# Patient Record
Sex: MALE | Race: WHITE | Employment: OTHER | URBAN - METROPOLITAN AREA
[De-identification: names, ages, dates, MRNs, and addresses within clinical notes are randomized per-mention and may not be internally consistent; named-entity substitution may affect disease eponyms.]

---

## 2017-03-17 ENCOUNTER — APPOINTMENT (OUTPATIENT)
Dept: GENERAL RADIOLOGY | Age: 82
End: 2017-03-17
Payer: MEDICARE

## 2017-03-17 ENCOUNTER — HOSPITAL ENCOUNTER (EMERGENCY)
Age: 82
Discharge: HOME | End: 2017-03-17
Attending: EMERGENCY MEDICINE
Payer: MEDICARE

## 2017-03-17 VITALS
SYSTOLIC BLOOD PRESSURE: 138 MMHG | OXYGEN SATURATION: 98 % | TEMPERATURE: 97.4 F | BODY MASS INDEX: 23.62 KG/M2 | HEIGHT: 70 IN | DIASTOLIC BLOOD PRESSURE: 72 MMHG | HEART RATE: 79 BPM | RESPIRATION RATE: 15 BRPM | WEIGHT: 165 LBS

## 2017-03-17 DIAGNOSIS — R26.81 UNSTEADINESS ON FEET: Primary | ICD-10-CM

## 2017-03-17 LAB
ALBUMIN SERPL-MCNC: 4 G/DL (ref 3.9–4.9)
ALP BLD-CCNC: 81 U/L (ref 35–104)
ALT SERPL-CCNC: 14 U/L (ref 0–41)
ANION GAP SERPL CALCULATED.3IONS-SCNC: 10 MEQ/L (ref 7–13)
AST SERPL-CCNC: 21 U/L (ref 0–40)
BASOPHILS ABSOLUTE: 0.1 K/UL (ref 0–0.2)
BASOPHILS RELATIVE PERCENT: 1.2 %
BILIRUB SERPL-MCNC: 0.4 MG/DL (ref 0–1.2)
BILIRUBIN URINE: NEGATIVE
BLOOD, URINE: NEGATIVE
BUN BLDV-MCNC: 31 MG/DL (ref 8–23)
CALCIUM SERPL-MCNC: 9.6 MG/DL (ref 8.6–10.2)
CHLORIDE BLD-SCNC: 99 MEQ/L (ref 98–107)
CLARITY: CLEAR
CO2: 29 MEQ/L (ref 22–29)
COLOR: YELLOW
CREAT SERPL-MCNC: 0.72 MG/DL (ref 0.7–1.2)
EKG ATRIAL RATE: 110 BPM
EKG Q-T INTERVAL: 466 MS
EKG QRS DURATION: 146 MS
EKG QTC CALCULATION (BAZETT): 541 MS
EKG R AXIS: 263 DEGREES
EKG T AXIS: 75 DEGREES
EKG VENTRICULAR RATE: 81 BPM
EOSINOPHILS ABSOLUTE: 0.3 K/UL (ref 0–0.7)
EOSINOPHILS RELATIVE PERCENT: 4.2 %
GFR AFRICAN AMERICAN: >60
GFR NON-AFRICAN AMERICAN: >60
GLOBULIN: 2.6 G/DL (ref 2.3–3.5)
GLUCOSE BLD-MCNC: 133 MG/DL (ref 74–109)
GLUCOSE URINE: NEGATIVE MG/DL
HCT VFR BLD CALC: 45.2 % (ref 42–52)
HEMOGLOBIN: 15.2 G/DL (ref 14–18)
KETONES, URINE: NEGATIVE MG/DL
LEUKOCYTE ESTERASE, URINE: NEGATIVE
LYMPHOCYTES ABSOLUTE: 1.5 K/UL (ref 1–4.8)
LYMPHOCYTES RELATIVE PERCENT: 23.1 %
MCH RBC QN AUTO: 31.3 PG (ref 27–31.3)
MCHC RBC AUTO-ENTMCNC: 33.7 % (ref 33–37)
MCV RBC AUTO: 92.9 FL (ref 80–100)
MONOCYTES ABSOLUTE: 0.9 K/UL (ref 0.2–0.8)
MONOCYTES RELATIVE PERCENT: 13.7 %
NEUTROPHILS ABSOLUTE: 3.7 K/UL (ref 1.4–6.5)
NEUTROPHILS RELATIVE PERCENT: 57.8 %
NITRITE, URINE: NEGATIVE
PDW BLD-RTO: 13.2 % (ref 11.5–14.5)
PH UA: 7 (ref 5–9)
PLATELET # BLD: 201 K/UL (ref 130–400)
POTASSIUM SERPL-SCNC: 4.1 MEQ/L (ref 3.5–5.1)
PROTEIN UA: NEGATIVE MG/DL
RAPID INFLUENZA  B AGN: NEGATIVE
RAPID INFLUENZA A AGN: NEGATIVE
RBC # BLD: 4.86 M/UL (ref 4.7–6.1)
SODIUM BLD-SCNC: 138 MEQ/L (ref 132–144)
SPECIFIC GRAVITY UA: 1.02 (ref 1–1.03)
TOTAL PROTEIN: 6.6 G/DL (ref 6.4–8.1)
TROPONIN: 0.02 NG/ML (ref 0–0.01)
URINE REFLEX TO CULTURE: NORMAL
UROBILINOGEN, URINE: 0.2 E.U./DL
WBC # BLD: 6.4 K/UL (ref 4.8–10.8)

## 2017-03-17 PROCEDURE — 36415 COLL VENOUS BLD VENIPUNCTURE: CPT

## 2017-03-17 PROCEDURE — 71010 XR CHEST PORTABLE: CPT

## 2017-03-17 PROCEDURE — 87077 CULTURE AEROBIC IDENTIFY: CPT

## 2017-03-17 PROCEDURE — 99284 EMERGENCY DEPT VISIT MOD MDM: CPT

## 2017-03-17 PROCEDURE — 96365 THER/PROPH/DIAG IV INF INIT: CPT

## 2017-03-17 PROCEDURE — 80053 COMPREHEN METABOLIC PANEL: CPT

## 2017-03-17 PROCEDURE — 87149 DNA/RNA DIRECT PROBE: CPT

## 2017-03-17 PROCEDURE — 93005 ELECTROCARDIOGRAM TRACING: CPT

## 2017-03-17 PROCEDURE — 6360000002 HC RX W HCPCS: Performed by: EMERGENCY MEDICINE

## 2017-03-17 PROCEDURE — 86403 PARTICLE AGGLUT ANTBDY SCRN: CPT

## 2017-03-17 PROCEDURE — 87040 BLOOD CULTURE FOR BACTERIA: CPT

## 2017-03-17 PROCEDURE — 84484 ASSAY OF TROPONIN QUANT: CPT

## 2017-03-17 PROCEDURE — 2580000003 HC RX 258: Performed by: EMERGENCY MEDICINE

## 2017-03-17 PROCEDURE — 81003 URINALYSIS AUTO W/O SCOPE: CPT

## 2017-03-17 PROCEDURE — 85025 COMPLETE CBC W/AUTO DIFF WBC: CPT

## 2017-03-17 RX ORDER — SODIUM CHLORIDE 0.9 % (FLUSH) 0.9 %
3 SYRINGE (ML) INJECTION EVERY 8 HOURS
Status: DISCONTINUED | OUTPATIENT
Start: 2017-03-17 | End: 2017-03-17 | Stop reason: HOSPADM

## 2017-03-17 RX ORDER — 0.9 % SODIUM CHLORIDE 0.9 %
500 INTRAVENOUS SOLUTION INTRAVENOUS ONCE
Status: COMPLETED | OUTPATIENT
Start: 2017-03-17 | End: 2017-03-17

## 2017-03-17 RX ADMIN — CEFTRIAXONE SODIUM 1 G: 1 INJECTION, POWDER, FOR SOLUTION INTRAMUSCULAR; INTRAVENOUS at 04:04

## 2017-03-17 RX ADMIN — Medication 3 ML: at 04:04

## 2017-03-17 RX ADMIN — SODIUM CHLORIDE 500 ML: 9 INJECTION, SOLUTION INTRAVENOUS at 04:04

## 2017-03-20 LAB
BLOOD CULTURE, ROUTINE: ABNORMAL
ORGANISM: ABNORMAL
ORGANISM: ABNORMAL

## 2017-03-22 LAB — CULTURE, BLOOD 2: NORMAL

## 2017-04-05 RX ORDER — SOTALOL HYDROCHLORIDE 80 MG/1
TABLET ORAL
Qty: 135 TABLET | Refills: 3 | Status: ON HOLD | OUTPATIENT
Start: 2017-04-05 | End: 2017-04-28 | Stop reason: HOSPADM

## 2017-04-11 DIAGNOSIS — I48.20 CHRONIC ATRIAL FIBRILLATION (HCC): ICD-10-CM

## 2017-04-25 ENCOUNTER — HOSPITAL ENCOUNTER (INPATIENT)
Age: 82
LOS: 3 days | Discharge: HOME | DRG: 310 | End: 2017-04-28
Attending: EMERGENCY MEDICINE | Admitting: INTERNAL MEDICINE
Payer: MEDICARE

## 2017-04-25 ENCOUNTER — APPOINTMENT (OUTPATIENT)
Dept: GENERAL RADIOLOGY | Age: 82
DRG: 310 | End: 2017-04-25
Payer: MEDICARE

## 2017-04-25 DIAGNOSIS — I47.20 VENTRICULAR TACHYCARDIA: ICD-10-CM

## 2017-04-25 DIAGNOSIS — I48.21 PERMANENT ATRIAL FIBRILLATION (HCC): Primary | ICD-10-CM

## 2017-04-25 PROBLEM — E78.2 MIXED DYSLIPIDEMIA: Status: ACTIVE | Noted: 2017-04-25

## 2017-04-25 PROBLEM — I10 ESSENTIAL HYPERTENSION: Status: ACTIVE | Noted: 2017-04-25

## 2017-04-25 LAB
ALBUMIN SERPL-MCNC: 4.2 G/DL (ref 3.9–4.9)
ALP BLD-CCNC: 82 U/L (ref 35–104)
ALT SERPL-CCNC: 20 U/L (ref 0–41)
ANION GAP SERPL CALCULATED.3IONS-SCNC: 15 MEQ/L (ref 7–13)
AST SERPL-CCNC: 22 U/L (ref 0–40)
BASOPHILS ABSOLUTE: 0 K/UL (ref 0–0.2)
BASOPHILS RELATIVE PERCENT: 0.5 %
BILIRUB SERPL-MCNC: 0.8 MG/DL (ref 0–1.2)
BILIRUBIN URINE: NEGATIVE
BLOOD, URINE: ABNORMAL
BUN BLDV-MCNC: 29 MG/DL (ref 8–23)
CALCIUM SERPL-MCNC: 9.8 MG/DL (ref 8.6–10.2)
CHLORIDE BLD-SCNC: 97 MEQ/L (ref 98–107)
CLARITY: CLEAR
CO2: 23 MEQ/L (ref 22–29)
COLOR: ABNORMAL
CREAT SERPL-MCNC: 0.94 MG/DL (ref 0.7–1.2)
EOSINOPHILS ABSOLUTE: 0.2 K/UL (ref 0–0.7)
EOSINOPHILS RELATIVE PERCENT: 2.6 %
GFR AFRICAN AMERICAN: >60
GFR NON-AFRICAN AMERICAN: >60
GLOBULIN: 2.7 G/DL (ref 2.3–3.5)
GLUCOSE BLD-MCNC: 144 MG/DL (ref 74–109)
GLUCOSE URINE: NEGATIVE MG/DL
HCT VFR BLD CALC: 46 % (ref 42–52)
HEMOGLOBIN: 15.7 G/DL (ref 14–18)
INR BLD: 1.2
KETONES, URINE: NEGATIVE MG/DL
LEUKOCYTE ESTERASE, URINE: ABNORMAL
LV EF: 55 %
LVEF MODALITY: NORMAL
LYMPHOCYTES ABSOLUTE: 1.4 K/UL (ref 1–4.8)
LYMPHOCYTES RELATIVE PERCENT: 18.8 %
MAGNESIUM: 2 MG/DL (ref 1.7–2.3)
MCH RBC QN AUTO: 31.3 PG (ref 27–31.3)
MCHC RBC AUTO-ENTMCNC: 34.2 % (ref 33–37)
MCV RBC AUTO: 91.5 FL (ref 80–100)
MONOCYTES ABSOLUTE: 0.8 K/UL (ref 0.2–0.8)
MONOCYTES RELATIVE PERCENT: 11.5 %
NEUTROPHILS ABSOLUTE: 4.8 K/UL (ref 1.4–6.5)
NEUTROPHILS RELATIVE PERCENT: 66.6 %
NITRITE, URINE: NEGATIVE
PDW BLD-RTO: 13.6 % (ref 11.5–14.5)
PH UA: 6 (ref 5–9)
PLATELET # BLD: 185 K/UL (ref 130–400)
POTASSIUM SERPL-SCNC: 4.4 MEQ/L (ref 3.5–5.1)
PRO-BNP: 5983 PG/ML
PROTEIN UA: NEGATIVE MG/DL
PROTHROMBIN TIME: 12.5 SEC (ref 8.1–13.7)
RBC # BLD: 5.02 M/UL (ref 4.7–6.1)
RBC UA: >100 /HPF (ref 0–2)
SODIUM BLD-SCNC: 135 MEQ/L (ref 132–144)
SPECIFIC GRAVITY UA: 1.01 (ref 1–1.03)
TOTAL CK: 68 U/L (ref 0–190)
TOTAL PROTEIN: 6.9 G/DL (ref 6.4–8.1)
TROPONIN: 0.01 NG/ML (ref 0–0.01)
TROPONIN: 0.02 NG/ML (ref 0–0.01)
TROPONIN: 0.03 NG/ML (ref 0–0.01)
TSH SERPL DL<=0.05 MIU/L-ACNC: 2.74 UIU/ML (ref 0.27–4.2)
UROBILINOGEN, URINE: 0.2 E.U./DL
WBC # BLD: 7.2 K/UL (ref 4.8–10.8)
WBC UA: ABNORMAL /HPF (ref 0–5)

## 2017-04-25 PROCEDURE — 36415 COLL VENOUS BLD VENIPUNCTURE: CPT

## 2017-04-25 PROCEDURE — 93306 TTE W/DOPPLER COMPLETE: CPT | Performed by: INTERNAL MEDICINE

## 2017-04-25 PROCEDURE — 6360000002 HC RX W HCPCS: Performed by: PHYSICIAN ASSISTANT

## 2017-04-25 PROCEDURE — 2580000003 HC RX 258

## 2017-04-25 PROCEDURE — 96374 THER/PROPH/DIAG INJ IV PUSH: CPT

## 2017-04-25 PROCEDURE — 93306 TTE W/DOPPLER COMPLETE: CPT

## 2017-04-25 PROCEDURE — 99285 EMERGENCY DEPT VISIT HI MDM: CPT

## 2017-04-25 PROCEDURE — 85025 COMPLETE CBC W/AUTO DIFF WBC: CPT

## 2017-04-25 PROCEDURE — 99222 1ST HOSP IP/OBS MODERATE 55: CPT | Performed by: PHYSICIAN ASSISTANT

## 2017-04-25 PROCEDURE — 85610 PROTHROMBIN TIME: CPT

## 2017-04-25 PROCEDURE — 71010 XR CHEST PORTABLE: CPT

## 2017-04-25 PROCEDURE — 81001 URINALYSIS AUTO W/SCOPE: CPT

## 2017-04-25 PROCEDURE — 96375 TX/PRO/DX INJ NEW DRUG ADDON: CPT

## 2017-04-25 PROCEDURE — 84443 ASSAY THYROID STIM HORMONE: CPT

## 2017-04-25 PROCEDURE — 83735 ASSAY OF MAGNESIUM: CPT

## 2017-04-25 PROCEDURE — 6360000002 HC RX W HCPCS: Performed by: EMERGENCY MEDICINE

## 2017-04-25 PROCEDURE — 2060000000 HC ICU INTERMEDIATE R&B

## 2017-04-25 PROCEDURE — 82550 ASSAY OF CK (CPK): CPT

## 2017-04-25 PROCEDURE — 2580000003 HC RX 258: Performed by: PHYSICIAN ASSISTANT

## 2017-04-25 PROCEDURE — 2580000003 HC RX 258: Performed by: EMERGENCY MEDICINE

## 2017-04-25 PROCEDURE — 6370000000 HC RX 637 (ALT 250 FOR IP): Performed by: PHYSICIAN ASSISTANT

## 2017-04-25 PROCEDURE — 80053 COMPREHEN METABOLIC PANEL: CPT

## 2017-04-25 PROCEDURE — 84484 ASSAY OF TROPONIN QUANT: CPT

## 2017-04-25 PROCEDURE — 93005 ELECTROCARDIOGRAM TRACING: CPT

## 2017-04-25 PROCEDURE — 83880 ASSAY OF NATRIURETIC PEPTIDE: CPT

## 2017-04-25 RX ORDER — ONDANSETRON 2 MG/ML
4 INJECTION INTRAMUSCULAR; INTRAVENOUS EVERY 6 HOURS PRN
Status: DISCONTINUED | OUTPATIENT
Start: 2017-04-25 | End: 2017-04-28 | Stop reason: HOSPADM

## 2017-04-25 RX ORDER — METOPROLOL TARTRATE 50 MG/1
50 TABLET, FILM COATED ORAL 2 TIMES DAILY
Status: DISCONTINUED | OUTPATIENT
Start: 2017-04-25 | End: 2017-04-28 | Stop reason: HOSPADM

## 2017-04-25 RX ORDER — HYDROCODONE BITARTRATE AND ACETAMINOPHEN 5; 325 MG/1; MG/1
1 TABLET ORAL EVERY 4 HOURS PRN
Status: DISCONTINUED | OUTPATIENT
Start: 2017-04-25 | End: 2017-04-28 | Stop reason: HOSPADM

## 2017-04-25 RX ORDER — MORPHINE SULFATE 2 MG/ML
2 INJECTION, SOLUTION INTRAMUSCULAR; INTRAVENOUS
Status: DISCONTINUED | OUTPATIENT
Start: 2017-04-25 | End: 2017-04-28 | Stop reason: HOSPADM

## 2017-04-25 RX ORDER — FAMOTIDINE 20 MG/1
20 TABLET, FILM COATED ORAL 2 TIMES DAILY PRN
Status: DISCONTINUED | OUTPATIENT
Start: 2017-04-25 | End: 2017-04-28 | Stop reason: HOSPADM

## 2017-04-25 RX ORDER — HYDROCODONE BITARTRATE AND ACETAMINOPHEN 5; 325 MG/1; MG/1
2 TABLET ORAL EVERY 4 HOURS PRN
Status: DISCONTINUED | OUTPATIENT
Start: 2017-04-25 | End: 2017-04-28 | Stop reason: HOSPADM

## 2017-04-25 RX ORDER — AMIODARONE HYDROCHLORIDE 50 MG/ML
150 INJECTION, SOLUTION INTRAVENOUS ONCE
Status: COMPLETED | OUTPATIENT
Start: 2017-04-25 | End: 2017-04-25

## 2017-04-25 RX ORDER — NITROGLYCERIN 0.4 MG/1
0.4 TABLET SUBLINGUAL EVERY 5 MIN PRN
Status: DISCONTINUED | OUTPATIENT
Start: 2017-04-25 | End: 2017-04-28 | Stop reason: HOSPADM

## 2017-04-25 RX ORDER — SODIUM CHLORIDE 0.9 % (FLUSH) 0.9 %
10 SYRINGE (ML) INJECTION PRN
Status: DISCONTINUED | OUTPATIENT
Start: 2017-04-25 | End: 2017-04-28 | Stop reason: HOSPADM

## 2017-04-25 RX ORDER — TAMSULOSIN HYDROCHLORIDE 0.4 MG/1
0.4 CAPSULE ORAL DAILY
Status: DISCONTINUED | OUTPATIENT
Start: 2017-04-25 | End: 2017-04-28 | Stop reason: HOSPADM

## 2017-04-25 RX ORDER — DOCUSATE SODIUM 100 MG/1
300 CAPSULE, LIQUID FILLED ORAL EVERY OTHER DAY
COMMUNITY
End: 2017-07-23 | Stop reason: ALTCHOICE

## 2017-04-25 RX ORDER — COLCHICINE 0.6 MG/1
0.6 TABLET ORAL DAILY
Status: DISCONTINUED | OUTPATIENT
Start: 2017-04-25 | End: 2017-04-28 | Stop reason: HOSPADM

## 2017-04-25 RX ORDER — ASPIRIN 81 MG/1
81 TABLET, CHEWABLE ORAL DAILY
Status: DISCONTINUED | OUTPATIENT
Start: 2017-04-25 | End: 2017-04-28 | Stop reason: HOSPADM

## 2017-04-25 RX ORDER — ATORVASTATIN CALCIUM 20 MG/1
20 TABLET, FILM COATED ORAL NIGHTLY
Status: DISCONTINUED | OUTPATIENT
Start: 2017-04-25 | End: 2017-04-28 | Stop reason: HOSPADM

## 2017-04-25 RX ORDER — MORPHINE SULFATE 4 MG/ML
4 INJECTION, SOLUTION INTRAMUSCULAR; INTRAVENOUS
Status: DISCONTINUED | OUTPATIENT
Start: 2017-04-25 | End: 2017-04-28 | Stop reason: HOSPADM

## 2017-04-25 RX ORDER — SODIUM CHLORIDE 9 MG/ML
INJECTION, SOLUTION INTRAVENOUS CONTINUOUS
Status: ACTIVE | OUTPATIENT
Start: 2017-04-25 | End: 2017-04-26

## 2017-04-25 RX ORDER — 0.9 % SODIUM CHLORIDE 0.9 %
500 INTRAVENOUS SOLUTION INTRAVENOUS ONCE
Status: COMPLETED | OUTPATIENT
Start: 2017-04-25 | End: 2017-04-25

## 2017-04-25 RX ORDER — MIDODRINE HYDROCHLORIDE 2.5 MG/1
5 TABLET ORAL 3 TIMES DAILY
Status: DISCONTINUED | OUTPATIENT
Start: 2017-04-25 | End: 2017-04-28 | Stop reason: HOSPADM

## 2017-04-25 RX ORDER — SODIUM CHLORIDE 9 MG/ML
INJECTION, SOLUTION INTRAVENOUS CONTINUOUS
Status: DISCONTINUED | OUTPATIENT
Start: 2017-04-25 | End: 2017-04-28 | Stop reason: HOSPADM

## 2017-04-25 RX ORDER — LOSARTAN POTASSIUM 50 MG/1
100 TABLET ORAL DAILY
Status: DISCONTINUED | OUTPATIENT
Start: 2017-04-25 | End: 2017-04-28 | Stop reason: HOSPADM

## 2017-04-25 RX ORDER — ACETAMINOPHEN 325 MG/1
650 TABLET ORAL EVERY 4 HOURS PRN
Status: DISCONTINUED | OUTPATIENT
Start: 2017-04-25 | End: 2017-04-28 | Stop reason: HOSPADM

## 2017-04-25 RX ORDER — SODIUM CHLORIDE 0.9 % (FLUSH) 0.9 %
10 SYRINGE (ML) INJECTION EVERY 12 HOURS SCHEDULED
Status: DISCONTINUED | OUTPATIENT
Start: 2017-04-25 | End: 2017-04-28 | Stop reason: HOSPADM

## 2017-04-25 RX ORDER — FINASTERIDE 5 MG/1
5 TABLET, FILM COATED ORAL DAILY
Status: DISCONTINUED | OUTPATIENT
Start: 2017-04-25 | End: 2017-04-28 | Stop reason: HOSPADM

## 2017-04-25 RX ADMIN — LOSARTAN POTASSIUM 100 MG: 50 TABLET ORAL at 18:58

## 2017-04-25 RX ADMIN — MIDODRINE HYDROCHLORIDE 5 MG: 2.5 TABLET ORAL at 21:50

## 2017-04-25 RX ADMIN — Medication 400 MG: at 18:58

## 2017-04-25 RX ADMIN — AMIODARONE HYDROCHLORIDE 1 MG/MIN: 50 INJECTION, SOLUTION INTRAVENOUS at 09:09

## 2017-04-25 RX ADMIN — SODIUM CHLORIDE 500 ML: 9 INJECTION, SOLUTION INTRAVENOUS at 08:55

## 2017-04-25 RX ADMIN — FINASTERIDE 5 MG: 5 TABLET, FILM COATED ORAL at 18:58

## 2017-04-25 RX ADMIN — ATORVASTATIN CALCIUM 20 MG: 20 TABLET, FILM COATED ORAL at 21:50

## 2017-04-25 RX ADMIN — LIDOCAINE HYDROCHLORIDE 100 MG: 20 INJECTION, SOLUTION INTRAVENOUS at 08:29

## 2017-04-25 RX ADMIN — COLCHICINE 0.6 MG: 0.6 TABLET, FILM COATED ORAL at 18:58

## 2017-04-25 RX ADMIN — AMIODARONE HYDROCHLORIDE 1 MG/MIN: 50 INJECTION, SOLUTION INTRAVENOUS at 17:00

## 2017-04-25 RX ADMIN — AMIODARONE HYDROCHLORIDE 150 MG: 50 INJECTION, SOLUTION INTRAVENOUS at 08:25

## 2017-04-25 RX ADMIN — ASPIRIN 81 MG: 81 TABLET, CHEWABLE ORAL at 18:57

## 2017-04-25 RX ADMIN — AMIODARONE HYDROCHLORIDE 150 MG: 50 INJECTION, SOLUTION INTRAVENOUS at 08:27

## 2017-04-25 RX ADMIN — METOPROLOL TARTRATE 50 MG: 50 TABLET ORAL at 21:50

## 2017-04-25 RX ADMIN — TAMSULOSIN HYDROCHLORIDE 0.4 MG: 0.4 CAPSULE ORAL at 18:58

## 2017-04-25 ASSESSMENT — PAIN SCALES - GENERAL: PAINLEVEL_OUTOF10: 1

## 2017-04-25 ASSESSMENT — ENCOUNTER SYMPTOMS
COUGH: 0
APNEA: 0
NAUSEA: 0
CHEST TIGHTNESS: 0
VOMITING: 0
NAUSEA: 1
SHORTNESS OF BREATH: 1
WHEEZING: 0
COLOR CHANGE: 0
ABDOMINAL DISTENTION: 0
SHORTNESS OF BREATH: 0
ABDOMINAL PAIN: 0

## 2017-04-26 LAB
ALBUMIN SERPL-MCNC: 3.7 G/DL (ref 3.9–4.9)
ALP BLD-CCNC: 71 U/L (ref 35–104)
ALT SERPL-CCNC: 17 U/L (ref 0–41)
ANION GAP SERPL CALCULATED.3IONS-SCNC: 13 MEQ/L (ref 7–13)
AST SERPL-CCNC: 21 U/L (ref 0–40)
BILIRUB SERPL-MCNC: 1 MG/DL (ref 0–1.2)
BUN BLDV-MCNC: 23 MG/DL (ref 8–23)
CALCIUM SERPL-MCNC: 8.8 MG/DL (ref 8.6–10.2)
CHLORIDE BLD-SCNC: 102 MEQ/L (ref 98–107)
CHOLESTEROL, TOTAL: 148 MG/DL (ref 0–199)
CO2: 20 MEQ/L (ref 22–29)
CREAT SERPL-MCNC: 0.91 MG/DL (ref 0.7–1.2)
GFR AFRICAN AMERICAN: >60
GFR NON-AFRICAN AMERICAN: >60
GLOBULIN: 2.4 G/DL (ref 2.3–3.5)
GLUCOSE BLD-MCNC: 128 MG/DL (ref 74–109)
HCT VFR BLD CALC: 42.7 % (ref 42–52)
HDLC SERPL-MCNC: 47 MG/DL (ref 40–59)
HEMOGLOBIN: 14.5 G/DL (ref 14–18)
INR BLD: 1.2
LDL CHOLESTEROL CALCULATED: 86 MG/DL (ref 0–129)
MAGNESIUM: 2.2 MG/DL (ref 1.7–2.3)
MCH RBC QN AUTO: 31.3 PG (ref 27–31.3)
MCHC RBC AUTO-ENTMCNC: 34.1 % (ref 33–37)
MCV RBC AUTO: 91.9 FL (ref 80–100)
PDW BLD-RTO: 13.1 % (ref 11.5–14.5)
PLATELET # BLD: 154 K/UL (ref 130–400)
POTASSIUM SERPL-SCNC: 3.8 MEQ/L (ref 3.5–5.1)
PROTHROMBIN TIME: 12.8 SEC (ref 8.1–13.7)
RBC # BLD: 4.65 M/UL (ref 4.7–6.1)
SODIUM BLD-SCNC: 135 MEQ/L (ref 132–144)
TOTAL PROTEIN: 6.1 G/DL (ref 6.4–8.1)
TRIGL SERPL-MCNC: 73 MG/DL (ref 0–200)
WBC # BLD: 6.5 K/UL (ref 4.8–10.8)

## 2017-04-26 PROCEDURE — 36415 COLL VENOUS BLD VENIPUNCTURE: CPT

## 2017-04-26 PROCEDURE — 2700000000 HC OXYGEN THERAPY PER DAY

## 2017-04-26 PROCEDURE — 80053 COMPREHEN METABOLIC PANEL: CPT

## 2017-04-26 PROCEDURE — 85610 PROTHROMBIN TIME: CPT

## 2017-04-26 PROCEDURE — 80061 LIPID PANEL: CPT

## 2017-04-26 PROCEDURE — 6370000000 HC RX 637 (ALT 250 FOR IP): Performed by: PHYSICIAN ASSISTANT

## 2017-04-26 PROCEDURE — 99232 SBSQ HOSP IP/OBS MODERATE 35: CPT | Performed by: PHYSICIAN ASSISTANT

## 2017-04-26 PROCEDURE — 2060000000 HC ICU INTERMEDIATE R&B

## 2017-04-26 PROCEDURE — 93005 ELECTROCARDIOGRAM TRACING: CPT

## 2017-04-26 PROCEDURE — 6360000002 HC RX W HCPCS: Performed by: PHYSICIAN ASSISTANT

## 2017-04-26 PROCEDURE — 83735 ASSAY OF MAGNESIUM: CPT

## 2017-04-26 PROCEDURE — 2580000003 HC RX 258: Performed by: PHYSICIAN ASSISTANT

## 2017-04-26 PROCEDURE — 85027 COMPLETE CBC AUTOMATED: CPT

## 2017-04-26 RX ORDER — AMIODARONE HYDROCHLORIDE 200 MG/1
200 TABLET ORAL DAILY
Status: DISCONTINUED | OUTPATIENT
Start: 2017-04-27 | End: 2017-04-28 | Stop reason: HOSPADM

## 2017-04-26 RX ORDER — AMIODARONE HYDROCHLORIDE 200 MG/1
200 TABLET ORAL 2 TIMES DAILY
Status: DISCONTINUED | OUTPATIENT
Start: 2017-04-26 | End: 2017-04-26

## 2017-04-26 RX ADMIN — MIDODRINE HYDROCHLORIDE 5 MG: 2.5 TABLET ORAL at 21:05

## 2017-04-26 RX ADMIN — LOSARTAN POTASSIUM 100 MG: 50 TABLET ORAL at 09:03

## 2017-04-26 RX ADMIN — APIXABAN 5 MG: 5 TABLET, FILM COATED ORAL at 21:04

## 2017-04-26 RX ADMIN — METOPROLOL TARTRATE 50 MG: 50 TABLET ORAL at 09:04

## 2017-04-26 RX ADMIN — AMIODARONE HYDROCHLORIDE 0.5 MG/MIN: 50 INJECTION, SOLUTION INTRAVENOUS at 00:20

## 2017-04-26 RX ADMIN — AMIODARONE HYDROCHLORIDE 200 MG: 200 TABLET ORAL at 12:11

## 2017-04-26 RX ADMIN — MIDODRINE HYDROCHLORIDE 5 MG: 2.5 TABLET ORAL at 15:06

## 2017-04-26 RX ADMIN — MIDODRINE HYDROCHLORIDE 5 MG: 2.5 TABLET ORAL at 09:04

## 2017-04-26 RX ADMIN — RIVAROXABAN 15 MG: 15 TABLET, FILM COATED ORAL at 09:04

## 2017-04-26 RX ADMIN — COLCHICINE 0.6 MG: 0.6 TABLET, FILM COATED ORAL at 09:03

## 2017-04-26 RX ADMIN — FINASTERIDE 5 MG: 5 TABLET, FILM COATED ORAL at 09:03

## 2017-04-26 RX ADMIN — Medication 400 MG: at 09:04

## 2017-04-26 RX ADMIN — ASPIRIN 81 MG: 81 TABLET, CHEWABLE ORAL at 09:04

## 2017-04-26 RX ADMIN — METOPROLOL TARTRATE 50 MG: 50 TABLET ORAL at 21:04

## 2017-04-26 RX ADMIN — ATORVASTATIN CALCIUM 20 MG: 20 TABLET, FILM COATED ORAL at 21:04

## 2017-04-26 RX ADMIN — TAMSULOSIN HYDROCHLORIDE 0.4 MG: 0.4 CAPSULE ORAL at 09:04

## 2017-04-26 RX ADMIN — Medication 10 ML: at 21:00

## 2017-04-26 ASSESSMENT — ENCOUNTER SYMPTOMS
VOMITING: 0
SHORTNESS OF BREATH: 0
ABDOMINAL PAIN: 0
NAUSEA: 0
COUGH: 0
COLOR CHANGE: 0
WHEEZING: 0
CHEST TIGHTNESS: 0
APNEA: 0

## 2017-04-26 ASSESSMENT — PAIN SCALES - GENERAL
PAINLEVEL_OUTOF10: 0

## 2017-04-27 LAB — MAGNESIUM: 2.1 MG/DL (ref 1.7–2.3)

## 2017-04-27 PROCEDURE — 6370000000 HC RX 637 (ALT 250 FOR IP): Performed by: PHYSICIAN ASSISTANT

## 2017-04-27 PROCEDURE — 83735 ASSAY OF MAGNESIUM: CPT

## 2017-04-27 PROCEDURE — 36415 COLL VENOUS BLD VENIPUNCTURE: CPT

## 2017-04-27 PROCEDURE — 2580000003 HC RX 258: Performed by: PHYSICIAN ASSISTANT

## 2017-04-27 PROCEDURE — 93005 ELECTROCARDIOGRAM TRACING: CPT

## 2017-04-27 PROCEDURE — 2060000000 HC ICU INTERMEDIATE R&B

## 2017-04-27 RX ADMIN — Medication 10 ML: at 21:15

## 2017-04-27 RX ADMIN — MIDODRINE HYDROCHLORIDE 5 MG: 2.5 TABLET ORAL at 13:57

## 2017-04-27 RX ADMIN — ASPIRIN 81 MG: 81 TABLET, CHEWABLE ORAL at 09:57

## 2017-04-27 RX ADMIN — APIXABAN 5 MG: 5 TABLET, FILM COATED ORAL at 21:15

## 2017-04-27 RX ADMIN — AMIODARONE HYDROCHLORIDE 200 MG: 200 TABLET ORAL at 09:56

## 2017-04-27 RX ADMIN — METOPROLOL TARTRATE 50 MG: 50 TABLET ORAL at 21:15

## 2017-04-27 RX ADMIN — APIXABAN 5 MG: 5 TABLET, FILM COATED ORAL at 09:56

## 2017-04-27 RX ADMIN — FINASTERIDE 5 MG: 5 TABLET, FILM COATED ORAL at 09:57

## 2017-04-27 RX ADMIN — COLCHICINE 0.6 MG: 0.6 TABLET, FILM COATED ORAL at 09:57

## 2017-04-27 RX ADMIN — ATORVASTATIN CALCIUM 20 MG: 20 TABLET, FILM COATED ORAL at 21:15

## 2017-04-27 RX ADMIN — TAMSULOSIN HYDROCHLORIDE 0.4 MG: 0.4 CAPSULE ORAL at 09:57

## 2017-04-27 RX ADMIN — METOPROLOL TARTRATE 50 MG: 50 TABLET ORAL at 09:56

## 2017-04-27 RX ADMIN — Medication 10 ML: at 09:58

## 2017-04-27 RX ADMIN — LOSARTAN POTASSIUM 100 MG: 50 TABLET ORAL at 09:56

## 2017-04-27 RX ADMIN — MIDODRINE HYDROCHLORIDE 5 MG: 2.5 TABLET ORAL at 09:56

## 2017-04-27 RX ADMIN — Medication 400 MG: at 09:56

## 2017-04-27 ASSESSMENT — ENCOUNTER SYMPTOMS
COLOR CHANGE: 0
SHORTNESS OF BREATH: 0
NAUSEA: 0
COUGH: 0
ABDOMINAL PAIN: 0
VOMITING: 0
WHEEZING: 0
APNEA: 0
CHEST TIGHTNESS: 0

## 2017-04-27 ASSESSMENT — PAIN SCALES - GENERAL
PAINLEVEL_OUTOF10: 0

## 2017-04-28 VITALS
SYSTOLIC BLOOD PRESSURE: 142 MMHG | BODY MASS INDEX: 23.62 KG/M2 | HEIGHT: 70 IN | TEMPERATURE: 97.4 F | DIASTOLIC BLOOD PRESSURE: 67 MMHG | OXYGEN SATURATION: 100 % | WEIGHT: 165 LBS | RESPIRATION RATE: 18 BRPM | HEART RATE: 72 BPM

## 2017-04-28 LAB
ANION GAP SERPL CALCULATED.3IONS-SCNC: 11 MEQ/L (ref 7–13)
BUN BLDV-MCNC: 23 MG/DL (ref 8–23)
CALCIUM SERPL-MCNC: 8.9 MG/DL (ref 8.6–10.2)
CHLORIDE BLD-SCNC: 105 MEQ/L (ref 98–107)
CO2: 22 MEQ/L (ref 22–29)
CREAT SERPL-MCNC: 0.91 MG/DL (ref 0.7–1.2)
GFR AFRICAN AMERICAN: >60
GFR NON-AFRICAN AMERICAN: >60
GLUCOSE BLD-MCNC: 98 MG/DL (ref 74–109)
HCT VFR BLD CALC: 43.1 % (ref 42–52)
HEMOGLOBIN: 14.5 G/DL (ref 14–18)
MAGNESIUM: 2 MG/DL (ref 1.7–2.3)
MCH RBC QN AUTO: 31.1 PG (ref 27–31.3)
MCHC RBC AUTO-ENTMCNC: 33.6 % (ref 33–37)
MCV RBC AUTO: 92.8 FL (ref 80–100)
PDW BLD-RTO: 13.3 % (ref 11.5–14.5)
PLATELET # BLD: 160 K/UL (ref 130–400)
POTASSIUM SERPL-SCNC: 3.7 MEQ/L (ref 3.5–5.1)
RBC # BLD: 4.64 M/UL (ref 4.7–6.1)
SODIUM BLD-SCNC: 138 MEQ/L (ref 132–144)
WBC # BLD: 5.9 K/UL (ref 4.8–10.8)

## 2017-04-28 PROCEDURE — 85027 COMPLETE CBC AUTOMATED: CPT

## 2017-04-28 PROCEDURE — 36415 COLL VENOUS BLD VENIPUNCTURE: CPT

## 2017-04-28 PROCEDURE — 99238 HOSP IP/OBS DSCHRG MGMT 30/<: CPT | Performed by: NURSE PRACTITIONER

## 2017-04-28 PROCEDURE — 93005 ELECTROCARDIOGRAM TRACING: CPT

## 2017-04-28 PROCEDURE — 83735 ASSAY OF MAGNESIUM: CPT

## 2017-04-28 PROCEDURE — 2580000003 HC RX 258: Performed by: PHYSICIAN ASSISTANT

## 2017-04-28 PROCEDURE — 6370000000 HC RX 637 (ALT 250 FOR IP): Performed by: PHYSICIAN ASSISTANT

## 2017-04-28 PROCEDURE — 80048 BASIC METABOLIC PNL TOTAL CA: CPT

## 2017-04-28 RX ORDER — AMIODARONE HYDROCHLORIDE 200 MG/1
200 TABLET ORAL DAILY
Qty: 30 TABLET | Refills: 3 | Status: ON HOLD | OUTPATIENT
Start: 2017-04-28 | End: 2018-03-04

## 2017-04-28 RX ADMIN — Medication 400 MG: at 09:00

## 2017-04-28 RX ADMIN — METOPROLOL TARTRATE 50 MG: 50 TABLET ORAL at 09:00

## 2017-04-28 RX ADMIN — Medication 10 ML: at 09:01

## 2017-04-28 RX ADMIN — MIDODRINE HYDROCHLORIDE 5 MG: 2.5 TABLET ORAL at 09:00

## 2017-04-28 RX ADMIN — COLCHICINE 0.6 MG: 0.6 TABLET, FILM COATED ORAL at 09:00

## 2017-04-28 RX ADMIN — LOSARTAN POTASSIUM 100 MG: 50 TABLET ORAL at 09:00

## 2017-04-28 RX ADMIN — ASPIRIN 81 MG: 81 TABLET, CHEWABLE ORAL at 09:00

## 2017-04-28 RX ADMIN — TAMSULOSIN HYDROCHLORIDE 0.4 MG: 0.4 CAPSULE ORAL at 09:00

## 2017-04-28 RX ADMIN — AMIODARONE HYDROCHLORIDE 200 MG: 200 TABLET ORAL at 09:00

## 2017-04-28 RX ADMIN — APIXABAN 5 MG: 5 TABLET, FILM COATED ORAL at 09:01

## 2017-04-28 RX ADMIN — FINASTERIDE 5 MG: 5 TABLET, FILM COATED ORAL at 09:00

## 2017-04-28 ASSESSMENT — PAIN SCALES - GENERAL: PAINLEVEL_OUTOF10: 0

## 2017-04-29 LAB
EKG ATRIAL RATE: 102 BPM
EKG ATRIAL RATE: 300 BPM
EKG ATRIAL RATE: 44 BPM
EKG ATRIAL RATE: 51 BPM
EKG ATRIAL RATE: 58 BPM
EKG ATRIAL RATE: 61 BPM
EKG ATRIAL RATE: 75 BPM
EKG Q-T INTERVAL: 454 MS
EKG Q-T INTERVAL: 460 MS
EKG Q-T INTERVAL: 486 MS
EKG Q-T INTERVAL: 486 MS
EKG Q-T INTERVAL: 500 MS
EKG Q-T INTERVAL: 514 MS
EKG Q-T INTERVAL: 516 MS
EKG QRS DURATION: 110 MS
EKG QRS DURATION: 124 MS
EKG QRS DURATION: 156 MS
EKG QRS DURATION: 158 MS
EKG QRS DURATION: 160 MS
EKG QRS DURATION: 164 MS
EKG QRS DURATION: 96 MS
EKG QTC CALCULATION (BAZETT): 475 MS
EKG QTC CALCULATION (BAZETT): 477 MS
EKG QTC CALCULATION (BAZETT): 511 MS
EKG QTC CALCULATION (BAZETT): 513 MS
EKG QTC CALCULATION (BAZETT): 530 MS
EKG QTC CALCULATION (BAZETT): 540 MS
EKG QTC CALCULATION (BAZETT): 546 MS
EKG R AXIS: 193 DEGREES
EKG R AXIS: 214 DEGREES
EKG R AXIS: 217 DEGREES
EKG R AXIS: 218 DEGREES
EKG R AXIS: 219 DEGREES
EKG R AXIS: 224 DEGREES
EKG R AXIS: 232 DEGREES
EKG T AXIS: -11 DEGREES
EKG T AXIS: 101 DEGREES
EKG T AXIS: 20 DEGREES
EKG T AXIS: 26 DEGREES
EKG T AXIS: 36 DEGREES
EKG T AXIS: 47 DEGREES
EKG T AXIS: 6 DEGREES
EKG VENTRICULAR RATE: 51 BPM
EKG VENTRICULAR RATE: 58 BPM
EKG VENTRICULAR RATE: 63 BPM
EKG VENTRICULAR RATE: 64 BPM
EKG VENTRICULAR RATE: 67 BPM
EKG VENTRICULAR RATE: 83 BPM
EKG VENTRICULAR RATE: 87 BPM

## 2017-05-01 ENCOUNTER — TELEPHONE (OUTPATIENT)
Dept: CARDIOLOGY | Age: 82
End: 2017-05-01

## 2017-05-01 DIAGNOSIS — I48.21 PERMANENT ATRIAL FIBRILLATION (HCC): Primary | ICD-10-CM

## 2017-05-01 DIAGNOSIS — Z09 HOSPITAL DISCHARGE FOLLOW-UP: ICD-10-CM

## 2017-05-01 DIAGNOSIS — I47.20 VENTRICULAR TACHYCARDIA: ICD-10-CM

## 2017-05-08 ENCOUNTER — OFFICE VISIT (OUTPATIENT)
Dept: CARDIOLOGY | Age: 82
End: 2017-05-08

## 2017-05-08 DIAGNOSIS — I11.9 HYPERTENSIVE HEART DISEASE WITHOUT HEART FAILURE: ICD-10-CM

## 2017-05-08 DIAGNOSIS — R31.9 HEMATURIA: ICD-10-CM

## 2017-05-08 DIAGNOSIS — Z92.89 HOSPITALIZATION WITHIN LAST 30 DAYS: ICD-10-CM

## 2017-05-08 DIAGNOSIS — I51.89 DIASTOLIC DYSFUNCTION: ICD-10-CM

## 2017-05-08 DIAGNOSIS — R91.1 LUNG NODULE: ICD-10-CM

## 2017-05-08 DIAGNOSIS — I48.0 PAROXYSMAL ATRIAL FIBRILLATION (HCC): Primary | ICD-10-CM

## 2017-05-08 PROCEDURE — 4040F PNEUMOC VAC/ADMIN/RCVD: CPT | Performed by: INTERNAL MEDICINE

## 2017-05-08 PROCEDURE — 93000 ELECTROCARDIOGRAM COMPLETE: CPT | Performed by: INTERNAL MEDICINE

## 2017-05-08 PROCEDURE — G8428 CUR MEDS NOT DOCUMENT: HCPCS | Performed by: INTERNAL MEDICINE

## 2017-05-08 PROCEDURE — 99215 OFFICE O/P EST HI 40 MIN: CPT | Performed by: INTERNAL MEDICINE

## 2017-05-08 PROCEDURE — G8420 CALC BMI NORM PARAMETERS: HCPCS | Performed by: INTERNAL MEDICINE

## 2017-05-08 PROCEDURE — 1111F DSCHRG MED/CURRENT MED MERGE: CPT | Performed by: INTERNAL MEDICINE

## 2017-05-08 PROCEDURE — 1123F ACP DISCUSS/DSCN MKR DOCD: CPT | Performed by: INTERNAL MEDICINE

## 2017-05-08 PROCEDURE — 1036F TOBACCO NON-USER: CPT | Performed by: INTERNAL MEDICINE

## 2017-05-09 RX ORDER — METOPROLOL TARTRATE 50 MG/1
50 TABLET, FILM COATED ORAL 2 TIMES DAILY
Qty: 180 TABLET | Refills: 3 | Status: SHIPPED | OUTPATIENT
Start: 2017-05-09

## 2017-05-17 LAB — URINE CULTURE, ROUTINE: NORMAL

## 2017-05-22 ENCOUNTER — OFFICE VISIT (OUTPATIENT)
Dept: CARDIOLOGY | Age: 82
End: 2017-05-22

## 2017-05-22 VITALS
WEIGHT: 182.5 LBS | DIASTOLIC BLOOD PRESSURE: 87 MMHG | HEART RATE: 60 BPM | HEIGHT: 71 IN | TEMPERATURE: 97.3 F | SYSTOLIC BLOOD PRESSURE: 145 MMHG | OXYGEN SATURATION: 96 % | BODY MASS INDEX: 25.55 KG/M2 | RESPIRATION RATE: 18 BRPM

## 2017-05-22 DIAGNOSIS — R06.02 SHORTNESS OF BREATH: ICD-10-CM

## 2017-05-22 DIAGNOSIS — R93.89 ABNORMAL CHEST X-RAY: ICD-10-CM

## 2017-05-22 DIAGNOSIS — I47.20 VENTRICULAR TACHYCARDIA: ICD-10-CM

## 2017-05-22 DIAGNOSIS — I48.20 CHRONIC ATRIAL FIBRILLATION (HCC): Primary | ICD-10-CM

## 2017-05-22 PROCEDURE — 1036F TOBACCO NON-USER: CPT | Performed by: INTERNAL MEDICINE

## 2017-05-22 PROCEDURE — G8420 CALC BMI NORM PARAMETERS: HCPCS | Performed by: INTERNAL MEDICINE

## 2017-05-22 PROCEDURE — 1123F ACP DISCUSS/DSCN MKR DOCD: CPT | Performed by: INTERNAL MEDICINE

## 2017-05-22 PROCEDURE — 1111F DSCHRG MED/CURRENT MED MERGE: CPT | Performed by: INTERNAL MEDICINE

## 2017-05-22 PROCEDURE — G8427 DOCREV CUR MEDS BY ELIG CLIN: HCPCS | Performed by: INTERNAL MEDICINE

## 2017-05-22 PROCEDURE — 4040F PNEUMOC VAC/ADMIN/RCVD: CPT | Performed by: INTERNAL MEDICINE

## 2017-05-22 PROCEDURE — 93000 ELECTROCARDIOGRAM COMPLETE: CPT | Performed by: INTERNAL MEDICINE

## 2017-05-22 PROCEDURE — 99213 OFFICE O/P EST LOW 20 MIN: CPT | Performed by: INTERNAL MEDICINE

## 2017-05-25 VITALS
DIASTOLIC BLOOD PRESSURE: 77 MMHG | HEIGHT: 71 IN | BODY MASS INDEX: 23.24 KG/M2 | OXYGEN SATURATION: 98 % | RESPIRATION RATE: 14 BRPM | WEIGHT: 166 LBS | SYSTOLIC BLOOD PRESSURE: 143 MMHG | HEART RATE: 85 BPM | TEMPERATURE: 97.8 F

## 2017-05-25 ASSESSMENT — ENCOUNTER SYMPTOMS
EYES NEGATIVE: 1
CHEST TIGHTNESS: 0
GASTROINTESTINAL NEGATIVE: 1
ALLERGIC/IMMUNOLOGIC NEGATIVE: 1
SHORTNESS OF BREATH: 0

## 2017-06-05 ASSESSMENT — ENCOUNTER SYMPTOMS
CHEST TIGHTNESS: 0
SHORTNESS OF BREATH: 1

## 2017-06-07 ASSESSMENT — ENCOUNTER SYMPTOMS
BACK PAIN: 1
EYES NEGATIVE: 1
ALLERGIC/IMMUNOLOGIC NEGATIVE: 1
GASTROINTESTINAL NEGATIVE: 1

## 2017-06-12 ENCOUNTER — HOSPITAL ENCOUNTER (OUTPATIENT)
Dept: CT IMAGING | Age: 82
Discharge: HOME | End: 2017-06-12
Payer: MEDICARE

## 2017-06-12 DIAGNOSIS — R06.02 SHORTNESS OF BREATH: ICD-10-CM

## 2017-06-12 DIAGNOSIS — R93.89 ABNORMAL CHEST X-RAY: ICD-10-CM

## 2017-06-12 PROCEDURE — 71250 CT THORAX DX C-: CPT

## 2017-07-06 ENCOUNTER — APPOINTMENT (OUTPATIENT)
Dept: CT IMAGING | Age: 82
End: 2017-07-06
Payer: MEDICARE

## 2017-07-06 ENCOUNTER — APPOINTMENT (OUTPATIENT)
Dept: GENERAL RADIOLOGY | Age: 82
End: 2017-07-06
Payer: MEDICARE

## 2017-07-06 ENCOUNTER — HOSPITAL ENCOUNTER (OUTPATIENT)
Age: 82
Setting detail: OBSERVATION
Discharge: HOME | End: 2017-07-09
Attending: EMERGENCY MEDICINE | Admitting: INTERNAL MEDICINE
Payer: MEDICARE

## 2017-07-06 DIAGNOSIS — R77.8 ELEVATED TROPONIN: ICD-10-CM

## 2017-07-06 DIAGNOSIS — R40.4 TRANSIENT ALTERATION OF AWARENESS: Primary | ICD-10-CM

## 2017-07-06 PROBLEM — I47.20 VENTRICULAR TACHYCARDIA (HCC): Status: RESOLVED | Noted: 2017-04-25 | Resolved: 2017-07-06

## 2017-07-06 PROBLEM — G93.41 METABOLIC ENCEPHALOPATHY: Status: ACTIVE | Noted: 2017-07-06

## 2017-07-06 LAB
ALBUMIN SERPL-MCNC: 4 G/DL (ref 3.9–4.9)
ALP BLD-CCNC: 81 U/L (ref 35–104)
ALT SERPL-CCNC: 17 U/L (ref 0–41)
ANION GAP SERPL CALCULATED.3IONS-SCNC: 14 MEQ/L (ref 7–13)
APTT: 32.7 SEC (ref 21.6–35.4)
AST SERPL-CCNC: 25 U/L (ref 0–40)
BASOPHILS ABSOLUTE: 0.1 K/UL (ref 0–0.2)
BASOPHILS RELATIVE PERCENT: 1.1 %
BILIRUB SERPL-MCNC: 0.7 MG/DL (ref 0–1.2)
BILIRUBIN URINE: NEGATIVE
BLOOD, URINE: ABNORMAL
BUN BLDV-MCNC: 24 MG/DL (ref 8–23)
CALCIUM SERPL-MCNC: 9.2 MG/DL (ref 8.6–10.2)
CHLORIDE BLD-SCNC: 96 MEQ/L (ref 98–107)
CLARITY: CLEAR
CO2: 27 MEQ/L (ref 22–29)
COLOR: YELLOW
CREAT SERPL-MCNC: 1.03 MG/DL (ref 0.7–1.2)
EOSINOPHILS ABSOLUTE: 0.2 K/UL (ref 0–0.7)
EOSINOPHILS RELATIVE PERCENT: 3.2 %
GFR AFRICAN AMERICAN: >60
GFR NON-AFRICAN AMERICAN: >60
GLOBULIN: 2.6 G/DL (ref 2.3–3.5)
GLUCOSE BLD-MCNC: 156 MG/DL (ref 74–109)
GLUCOSE URINE: NEGATIVE MG/DL
HCT VFR BLD CALC: 45 % (ref 42–52)
HEMOGLOBIN: 14.9 G/DL (ref 14–18)
INR BLD: 1.2
KETONES, URINE: NEGATIVE MG/DL
LEUKOCYTE ESTERASE, URINE: NEGATIVE
LYMPHOCYTES ABSOLUTE: 1 K/UL (ref 1–4.8)
LYMPHOCYTES RELATIVE PERCENT: 15.7 %
MCH RBC QN AUTO: 30 PG (ref 27–31.3)
MCHC RBC AUTO-ENTMCNC: 33.1 % (ref 33–37)
MCV RBC AUTO: 90.7 FL (ref 80–100)
MONOCYTES ABSOLUTE: 0.9 K/UL (ref 0.2–0.8)
MONOCYTES RELATIVE PERCENT: 14 %
NEUTROPHILS ABSOLUTE: 4.3 K/UL (ref 1.4–6.5)
NEUTROPHILS RELATIVE PERCENT: 66 %
NITRITE, URINE: NEGATIVE
PDW BLD-RTO: 14.2 % (ref 11.5–14.5)
PH UA: 5 (ref 5–9)
PLATELET # BLD: 168 K/UL (ref 130–400)
POTASSIUM SERPL-SCNC: 4.1 MEQ/L (ref 3.5–5.1)
PROTEIN UA: NEGATIVE MG/DL
PROTHROMBIN TIME: 12.8 SEC (ref 8.1–13.7)
RBC # BLD: 4.96 M/UL (ref 4.7–6.1)
SODIUM BLD-SCNC: 137 MEQ/L (ref 132–144)
SPECIFIC GRAVITY UA: 1.01 (ref 1–1.03)
TOTAL CK: 118 U/L (ref 0–190)
TOTAL PROTEIN: 6.6 G/DL (ref 6.4–8.1)
TROPONIN: 0.03 NG/ML (ref 0–0.01)
URINE REFLEX TO CULTURE: YES
UROBILINOGEN, URINE: 0.2 E.U./DL
WBC # BLD: 6.6 K/UL (ref 4.8–10.8)

## 2017-07-06 PROCEDURE — 87077 CULTURE AEROBIC IDENTIFY: CPT

## 2017-07-06 PROCEDURE — 93005 ELECTROCARDIOGRAM TRACING: CPT

## 2017-07-06 PROCEDURE — 6360000002 HC RX W HCPCS: Performed by: EMERGENCY MEDICINE

## 2017-07-06 PROCEDURE — 36415 COLL VENOUS BLD VENIPUNCTURE: CPT

## 2017-07-06 PROCEDURE — 96366 THER/PROPH/DIAG IV INF ADDON: CPT

## 2017-07-06 PROCEDURE — G0378 HOSPITAL OBSERVATION PER HR: HCPCS

## 2017-07-06 PROCEDURE — 85730 THROMBOPLASTIN TIME PARTIAL: CPT

## 2017-07-06 PROCEDURE — 71010 XR CHEST PORTABLE: CPT

## 2017-07-06 PROCEDURE — 99285 EMERGENCY DEPT VISIT HI MDM: CPT

## 2017-07-06 PROCEDURE — 85025 COMPLETE CBC W/AUTO DIFF WBC: CPT

## 2017-07-06 PROCEDURE — 85610 PROTHROMBIN TIME: CPT

## 2017-07-06 PROCEDURE — 96368 THER/DIAG CONCURRENT INF: CPT

## 2017-07-06 PROCEDURE — 2580000003 HC RX 258: Performed by: EMERGENCY MEDICINE

## 2017-07-06 PROCEDURE — 80053 COMPREHEN METABOLIC PANEL: CPT

## 2017-07-06 PROCEDURE — 96365 THER/PROPH/DIAG IV INF INIT: CPT

## 2017-07-06 PROCEDURE — 87086 URINE CULTURE/COLONY COUNT: CPT

## 2017-07-06 PROCEDURE — 87149 DNA/RNA DIRECT PROBE: CPT

## 2017-07-06 PROCEDURE — 70450 CT HEAD/BRAIN W/O DYE: CPT

## 2017-07-06 PROCEDURE — 82550 ASSAY OF CK (CPK): CPT

## 2017-07-06 PROCEDURE — 87040 BLOOD CULTURE FOR BACTERIA: CPT

## 2017-07-06 PROCEDURE — 81001 URINALYSIS AUTO W/SCOPE: CPT

## 2017-07-06 PROCEDURE — 84484 ASSAY OF TROPONIN QUANT: CPT

## 2017-07-06 RX ORDER — HYDROCODONE BITARTRATE AND ACETAMINOPHEN 5; 325 MG/1; MG/1
1 TABLET ORAL EVERY 4 HOURS PRN
Status: DISCONTINUED | OUTPATIENT
Start: 2017-07-06 | End: 2017-07-07

## 2017-07-06 RX ORDER — MORPHINE SULFATE 2 MG/ML
2 INJECTION, SOLUTION INTRAMUSCULAR; INTRAVENOUS
Status: CANCELLED | OUTPATIENT
Start: 2017-07-06

## 2017-07-06 RX ORDER — SODIUM CHLORIDE 0.9 % (FLUSH) 0.9 %
10 SYRINGE (ML) INJECTION PRN
Status: DISCONTINUED | OUTPATIENT
Start: 2017-07-06 | End: 2017-07-07

## 2017-07-06 RX ORDER — 0.9 % SODIUM CHLORIDE 0.9 %
500 INTRAVENOUS SOLUTION INTRAVENOUS ONCE
Status: COMPLETED | OUTPATIENT
Start: 2017-07-06 | End: 2017-07-06

## 2017-07-06 RX ORDER — HYDROCODONE BITARTRATE AND ACETAMINOPHEN 5; 325 MG/1; MG/1
2 TABLET ORAL EVERY 4 HOURS PRN
Status: DISCONTINUED | OUTPATIENT
Start: 2017-07-06 | End: 2017-07-07

## 2017-07-06 RX ORDER — ACETAMINOPHEN 325 MG/1
650 TABLET ORAL EVERY 4 HOURS PRN
Status: DISCONTINUED | OUTPATIENT
Start: 2017-07-06 | End: 2017-07-07 | Stop reason: SDUPTHER

## 2017-07-06 RX ORDER — MORPHINE SULFATE 4 MG/ML
4 INJECTION, SOLUTION INTRAMUSCULAR; INTRAVENOUS
Status: CANCELLED | OUTPATIENT
Start: 2017-07-06

## 2017-07-06 RX ORDER — SODIUM CHLORIDE 0.9 % (FLUSH) 0.9 %
10 SYRINGE (ML) INJECTION EVERY 12 HOURS SCHEDULED
Status: DISCONTINUED | OUTPATIENT
Start: 2017-07-06 | End: 2017-07-07

## 2017-07-06 RX ORDER — SODIUM CHLORIDE 0.9 % (FLUSH) 0.9 %
3 SYRINGE (ML) INJECTION EVERY 8 HOURS
Status: DISCONTINUED | OUTPATIENT
Start: 2017-07-06 | End: 2017-07-07

## 2017-07-06 RX ADMIN — CEFTRIAXONE SODIUM 1 G: 1 INJECTION, POWDER, FOR SOLUTION INTRAMUSCULAR; INTRAVENOUS at 22:09

## 2017-07-06 RX ADMIN — SODIUM CHLORIDE 500 ML: 9 INJECTION, SOLUTION INTRAVENOUS at 21:28

## 2017-07-06 RX ADMIN — Medication 3 ML: at 21:23

## 2017-07-06 RX ADMIN — AZITHROMYCIN MONOHYDRATE 500 MG: 500 INJECTION, POWDER, LYOPHILIZED, FOR SOLUTION INTRAVENOUS at 21:23

## 2017-07-07 ENCOUNTER — APPOINTMENT (OUTPATIENT)
Dept: MRI IMAGING | Age: 82
End: 2017-07-07
Payer: MEDICARE

## 2017-07-07 ENCOUNTER — APPOINTMENT (OUTPATIENT)
Dept: ULTRASOUND IMAGING | Age: 82
End: 2017-07-07
Payer: MEDICARE

## 2017-07-07 LAB
ALBUMIN SERPL-MCNC: 3.7 G/DL (ref 3.9–4.9)
ALP BLD-CCNC: 74 U/L (ref 35–104)
ALT SERPL-CCNC: 16 U/L (ref 0–41)
ANION GAP SERPL CALCULATED.3IONS-SCNC: 13 MEQ/L (ref 7–13)
AST SERPL-CCNC: 22 U/L (ref 0–40)
BACTERIA: ABNORMAL /HPF
BILIRUB SERPL-MCNC: 0.7 MG/DL (ref 0–1.2)
BUN BLDV-MCNC: 23 MG/DL (ref 8–23)
CALCIUM SERPL-MCNC: 9 MG/DL (ref 8.6–10.2)
CASTS 2: ABNORMAL /LPF
CASTS UA: ABNORMAL /LPF
CASTS: ABNORMAL /LPF
CHLORIDE BLD-SCNC: 100 MEQ/L (ref 98–107)
CO2: 24 MEQ/L (ref 22–29)
CREAT SERPL-MCNC: 0.9 MG/DL (ref 0.7–1.2)
EPITHELIAL CELLS, UA: ABNORMAL /HPF
GFR AFRICAN AMERICAN: >60
GFR NON-AFRICAN AMERICAN: >60
GLOBULIN: 2.5 G/DL (ref 2.3–3.5)
GLUCOSE BLD-MCNC: 105 MG/DL (ref 74–109)
HCT VFR BLD CALC: 42.9 % (ref 42–52)
HEMOGLOBIN: 14.2 G/DL (ref 14–18)
MCH RBC QN AUTO: 30.1 PG (ref 27–31.3)
MCHC RBC AUTO-ENTMCNC: 33.2 % (ref 33–37)
MCV RBC AUTO: 90.8 FL (ref 80–100)
MUCUS: PRESENT
PDW BLD-RTO: 14.3 % (ref 11.5–14.5)
PLATELET # BLD: 160 K/UL (ref 130–400)
POTASSIUM SERPL-SCNC: 4.1 MEQ/L (ref 3.5–5.1)
RBC # BLD: 4.72 M/UL (ref 4.7–6.1)
RBC UA: ABNORMAL /HPF (ref 0–2)
SODIUM BLD-SCNC: 137 MEQ/L (ref 132–144)
TOTAL PROTEIN: 6.2 G/DL (ref 6.4–8.1)
TROPONIN: 0.02 NG/ML (ref 0–0.01)
TROPONIN: 0.02 NG/ML (ref 0–0.01)
WBC # BLD: 7.7 K/UL (ref 4.8–10.8)
WBC UA: ABNORMAL /HPF (ref 0–5)

## 2017-07-07 PROCEDURE — 97165 OT EVAL LOW COMPLEX 30 MIN: CPT

## 2017-07-07 PROCEDURE — 6370000000 HC RX 637 (ALT 250 FOR IP): Performed by: INTERNAL MEDICINE

## 2017-07-07 PROCEDURE — G8978 MOBILITY CURRENT STATUS: HCPCS

## 2017-07-07 PROCEDURE — 6360000002 HC RX W HCPCS: Performed by: INTERNAL MEDICINE

## 2017-07-07 PROCEDURE — G8979 MOBILITY GOAL STATUS: HCPCS

## 2017-07-07 PROCEDURE — 36415 COLL VENOUS BLD VENIPUNCTURE: CPT

## 2017-07-07 PROCEDURE — 97162 PT EVAL MOD COMPLEX 30 MIN: CPT

## 2017-07-07 PROCEDURE — G0378 HOSPITAL OBSERVATION PER HR: HCPCS

## 2017-07-07 PROCEDURE — 84484 ASSAY OF TROPONIN QUANT: CPT

## 2017-07-07 PROCEDURE — 85027 COMPLETE CBC AUTOMATED: CPT

## 2017-07-07 PROCEDURE — 96375 TX/PRO/DX INJ NEW DRUG ADDON: CPT

## 2017-07-07 PROCEDURE — G8987 SELF CARE CURRENT STATUS: HCPCS

## 2017-07-07 PROCEDURE — G8988 SELF CARE GOAL STATUS: HCPCS

## 2017-07-07 PROCEDURE — 2580000003 HC RX 258: Performed by: EMERGENCY MEDICINE

## 2017-07-07 PROCEDURE — 80053 COMPREHEN METABOLIC PANEL: CPT

## 2017-07-07 PROCEDURE — 93880 EXTRACRANIAL BILAT STUDY: CPT

## 2017-07-07 PROCEDURE — 2580000003 HC RX 258: Performed by: INTERNAL MEDICINE

## 2017-07-07 PROCEDURE — 70551 MRI BRAIN STEM W/O DYE: CPT

## 2017-07-07 RX ORDER — ACETAMINOPHEN 325 MG/1
650 TABLET ORAL EVERY 4 HOURS PRN
Status: DISCONTINUED | OUTPATIENT
Start: 2017-07-07 | End: 2017-07-09 | Stop reason: HOSPADM

## 2017-07-07 RX ORDER — LOSARTAN POTASSIUM 50 MG/1
100 TABLET ORAL DAILY
Status: DISCONTINUED | OUTPATIENT
Start: 2017-07-07 | End: 2017-07-07

## 2017-07-07 RX ORDER — FAMOTIDINE 20 MG/1
20 TABLET, FILM COATED ORAL 2 TIMES DAILY
Status: DISCONTINUED | OUTPATIENT
Start: 2017-07-07 | End: 2017-07-08

## 2017-07-07 RX ORDER — MAGNESIUM OXIDE 400 MG/1
400 TABLET ORAL
Status: DISCONTINUED | OUTPATIENT
Start: 2017-07-07 | End: 2017-07-07 | Stop reason: CLARIF

## 2017-07-07 RX ORDER — AMIODARONE HYDROCHLORIDE 200 MG/1
200 TABLET ORAL DAILY
Status: DISCONTINUED | OUTPATIENT
Start: 2017-07-07 | End: 2017-07-09 | Stop reason: HOSPADM

## 2017-07-07 RX ORDER — COLCHICINE 0.6 MG/1
0.6 TABLET ORAL DAILY
Status: DISCONTINUED | OUTPATIENT
Start: 2017-07-07 | End: 2017-07-09 | Stop reason: HOSPADM

## 2017-07-07 RX ORDER — TAMSULOSIN HYDROCHLORIDE 0.4 MG/1
0.4 CAPSULE ORAL DAILY
Status: DISCONTINUED | OUTPATIENT
Start: 2017-07-07 | End: 2017-07-09 | Stop reason: HOSPADM

## 2017-07-07 RX ORDER — MAGNESIUM OXIDE 400 MG/1
400 TABLET ORAL
COMMUNITY
Start: 2017-07-07 | End: 2018-01-04 | Stop reason: SDUPTHER

## 2017-07-07 RX ORDER — FINASTERIDE 5 MG/1
5 TABLET, FILM COATED ORAL DAILY
Status: DISCONTINUED | OUTPATIENT
Start: 2017-07-07 | End: 2017-07-09 | Stop reason: HOSPADM

## 2017-07-07 RX ORDER — FUROSEMIDE 10 MG/ML
40 INJECTION INTRAMUSCULAR; INTRAVENOUS 2 TIMES DAILY
Status: DISCONTINUED | OUTPATIENT
Start: 2017-07-07 | End: 2017-07-08

## 2017-07-07 RX ORDER — ONDANSETRON 2 MG/ML
4 INJECTION INTRAMUSCULAR; INTRAVENOUS EVERY 6 HOURS PRN
Status: DISCONTINUED | OUTPATIENT
Start: 2017-07-07 | End: 2017-07-09 | Stop reason: HOSPADM

## 2017-07-07 RX ORDER — SODIUM CHLORIDE 9 MG/ML
INJECTION, SOLUTION INTRAVENOUS CONTINUOUS
Status: DISCONTINUED | OUTPATIENT
Start: 2017-07-07 | End: 2017-07-07

## 2017-07-07 RX ORDER — METOPROLOL TARTRATE 50 MG/1
50 TABLET, FILM COATED ORAL 2 TIMES DAILY
Status: DISCONTINUED | OUTPATIENT
Start: 2017-07-07 | End: 2017-07-09 | Stop reason: HOSPADM

## 2017-07-07 RX ORDER — DOCUSATE SODIUM 100 MG/1
100 CAPSULE, LIQUID FILLED ORAL 2 TIMES DAILY
Status: DISCONTINUED | OUTPATIENT
Start: 2017-07-07 | End: 2017-07-09 | Stop reason: HOSPADM

## 2017-07-07 RX ORDER — MIDODRINE HYDROCHLORIDE 5 MG/1
5 TABLET ORAL
Status: DISCONTINUED | OUTPATIENT
Start: 2017-07-07 | End: 2017-07-09 | Stop reason: HOSPADM

## 2017-07-07 RX ADMIN — RIVAROXABAN 15 MG: 15 TABLET, FILM COATED ORAL at 09:14

## 2017-07-07 RX ADMIN — Medication 10 ML: at 01:35

## 2017-07-07 RX ADMIN — MIDODRINE HYDROCHLORIDE 5 MG: 5 TABLET ORAL at 09:09

## 2017-07-07 RX ADMIN — COLCHICINE 0.6 MG: 0.6 TABLET, FILM COATED ORAL at 12:42

## 2017-07-07 RX ADMIN — SODIUM CHLORIDE: 9 INJECTION, SOLUTION INTRAVENOUS at 02:15

## 2017-07-07 RX ADMIN — AMIODARONE HYDROCHLORIDE 200 MG: 200 TABLET ORAL at 09:09

## 2017-07-07 RX ADMIN — DOCUSATE SODIUM 100 MG: 100 CAPSULE, LIQUID FILLED ORAL at 09:08

## 2017-07-07 RX ADMIN — MIDODRINE HYDROCHLORIDE 5 MG: 5 TABLET ORAL at 12:43

## 2017-07-07 RX ADMIN — MAGNESIUM OXIDE TAB 400 MG (241.3 MG ELEMENTAL MG) 400 MG: 400 (241.3 MG) TAB at 09:45

## 2017-07-07 RX ADMIN — MIDODRINE HYDROCHLORIDE 5 MG: 5 TABLET ORAL at 16:30

## 2017-07-07 RX ADMIN — METOPROLOL TARTRATE 50 MG: 50 TABLET, FILM COATED ORAL at 09:12

## 2017-07-07 RX ADMIN — FINASTERIDE 5 MG: 5 TABLET, FILM COATED ORAL at 09:12

## 2017-07-07 RX ADMIN — DOCUSATE SODIUM 100 MG: 100 CAPSULE, LIQUID FILLED ORAL at 21:14

## 2017-07-07 RX ADMIN — TAMSULOSIN HYDROCHLORIDE 0.4 MG: 0.4 CAPSULE ORAL at 09:11

## 2017-07-07 RX ADMIN — VERAPAMIL HYDROCHLORIDE 180 MG: 180 TABLET, FILM COATED, EXTENDED RELEASE ORAL at 21:10

## 2017-07-07 RX ADMIN — METOPROLOL TARTRATE 50 MG: 50 TABLET, FILM COATED ORAL at 21:12

## 2017-07-07 RX ADMIN — FUROSEMIDE 40 MG: 10 INJECTION, SOLUTION INTRAMUSCULAR; INTRAVENOUS at 17:37

## 2017-07-07 RX ADMIN — FAMOTIDINE 20 MG: 20 TABLET ORAL at 21:14

## 2017-07-07 RX ADMIN — MAGNESIUM OXIDE 400 MG: 400 TABLET ORAL at 09:14

## 2017-07-07 RX ADMIN — FAMOTIDINE 20 MG: 20 TABLET ORAL at 09:09

## 2017-07-07 ASSESSMENT — ENCOUNTER SYMPTOMS
HEARTBURN: 0
BLURRED VISION: 0
NAUSEA: 0
SPUTUM PRODUCTION: 0
VOMITING: 0
CONSTIPATION: 0
BLOOD IN STOOL: 0
SHORTNESS OF BREATH: 0
ABDOMINAL PAIN: 0
COUGH: 0
HEMOPTYSIS: 0
ORTHOPNEA: 0
DIARRHEA: 0
DOUBLE VISION: 0
WHEEZING: 0

## 2017-07-07 ASSESSMENT — PAIN SCALES - GENERAL
PAINLEVEL_OUTOF10: 0

## 2017-07-08 LAB
ALBUMIN SERPL-MCNC: 3.5 G/DL (ref 3.9–4.9)
ALP BLD-CCNC: 72 U/L (ref 35–104)
ALT SERPL-CCNC: 14 U/L (ref 0–41)
ANION GAP SERPL CALCULATED.3IONS-SCNC: 12 MEQ/L (ref 7–13)
AST SERPL-CCNC: 20 U/L (ref 0–40)
BILIRUB SERPL-MCNC: 0.9 MG/DL (ref 0–1.2)
BLOOD CULTURE, ROUTINE: ABNORMAL
BUN BLDV-MCNC: 19 MG/DL (ref 8–23)
CALCIUM SERPL-MCNC: 8.6 MG/DL (ref 8.6–10.2)
CHLORIDE BLD-SCNC: 99 MEQ/L (ref 98–107)
CHOLESTEROL, TOTAL: 134 MG/DL (ref 0–199)
CO2: 24 MEQ/L (ref 22–29)
CREAT SERPL-MCNC: 0.92 MG/DL (ref 0.7–1.2)
GFR AFRICAN AMERICAN: >60
GFR NON-AFRICAN AMERICAN: >60
GLOBULIN: 2.2 G/DL (ref 2.3–3.5)
GLUCOSE BLD-MCNC: 98 MG/DL (ref 74–109)
HDLC SERPL-MCNC: 49 MG/DL (ref 40–59)
LDL CHOLESTEROL CALCULATED: 73 MG/DL (ref 0–129)
ORGANISM: ABNORMAL
ORGANISM: ABNORMAL
POTASSIUM SERPL-SCNC: 3.3 MEQ/L (ref 3.5–5.1)
SODIUM BLD-SCNC: 135 MEQ/L (ref 132–144)
TOTAL PROTEIN: 5.7 G/DL (ref 6.4–8.1)
TRIGL SERPL-MCNC: 62 MG/DL (ref 0–200)
URINE CULTURE, ROUTINE: NORMAL

## 2017-07-08 PROCEDURE — 6360000002 HC RX W HCPCS: Performed by: INTERNAL MEDICINE

## 2017-07-08 PROCEDURE — 80053 COMPREHEN METABOLIC PANEL: CPT

## 2017-07-08 PROCEDURE — 93005 ELECTROCARDIOGRAM TRACING: CPT

## 2017-07-08 PROCEDURE — 96376 TX/PRO/DX INJ SAME DRUG ADON: CPT

## 2017-07-08 PROCEDURE — G0378 HOSPITAL OBSERVATION PER HR: HCPCS

## 2017-07-08 PROCEDURE — 99222 1ST HOSP IP/OBS MODERATE 55: CPT | Performed by: INTERNAL MEDICINE

## 2017-07-08 PROCEDURE — 36415 COLL VENOUS BLD VENIPUNCTURE: CPT

## 2017-07-08 PROCEDURE — 6370000000 HC RX 637 (ALT 250 FOR IP): Performed by: INTERNAL MEDICINE

## 2017-07-08 PROCEDURE — 80061 LIPID PANEL: CPT

## 2017-07-08 PROCEDURE — 6370000000 HC RX 637 (ALT 250 FOR IP): Performed by: NURSE PRACTITIONER

## 2017-07-08 RX ORDER — POTASSIUM CHLORIDE 1.5 G/1.77G
20 POWDER, FOR SOLUTION ORAL 2 TIMES DAILY
Status: DISCONTINUED | OUTPATIENT
Start: 2017-07-08 | End: 2017-07-09 | Stop reason: HOSPADM

## 2017-07-08 RX ORDER — POTASSIUM CHLORIDE 20 MEQ/1
40 TABLET, EXTENDED RELEASE ORAL ONCE
Status: COMPLETED | OUTPATIENT
Start: 2017-07-08 | End: 2017-07-08

## 2017-07-08 RX ORDER — ALPRAZOLAM 0.25 MG/1
0.25 TABLET ORAL NIGHTLY PRN
Status: DISCONTINUED | OUTPATIENT
Start: 2017-07-08 | End: 2017-07-08

## 2017-07-08 RX ORDER — ALPRAZOLAM 0.25 MG/1
0.25 TABLET ORAL NIGHTLY PRN
Status: DISCONTINUED | OUTPATIENT
Start: 2017-07-08 | End: 2017-07-09 | Stop reason: HOSPADM

## 2017-07-08 RX ORDER — FUROSEMIDE 10 MG/ML
40 INJECTION INTRAMUSCULAR; INTRAVENOUS DAILY
Status: DISCONTINUED | OUTPATIENT
Start: 2017-07-09 | End: 2017-07-09 | Stop reason: HOSPADM

## 2017-07-08 RX ADMIN — TAMSULOSIN HYDROCHLORIDE 0.4 MG: 0.4 CAPSULE ORAL at 13:46

## 2017-07-08 RX ADMIN — FUROSEMIDE 40 MG: 10 INJECTION, SOLUTION INTRAMUSCULAR; INTRAVENOUS at 07:54

## 2017-07-08 RX ADMIN — METOPROLOL TARTRATE 50 MG: 50 TABLET, FILM COATED ORAL at 13:47

## 2017-07-08 RX ADMIN — POTASSIUM CHLORIDE 40 MEQ: 1500 TABLET, EXTENDED RELEASE ORAL at 09:55

## 2017-07-08 RX ADMIN — RIVAROXABAN 15 MG: 15 TABLET, FILM COATED ORAL at 07:54

## 2017-07-08 RX ADMIN — POTASSIUM CHLORIDE 20 MEQ: 1.5 POWDER, FOR SOLUTION ORAL at 20:57

## 2017-07-08 RX ADMIN — DOCUSATE SODIUM 100 MG: 100 CAPSULE, LIQUID FILLED ORAL at 09:54

## 2017-07-08 RX ADMIN — MIDODRINE HYDROCHLORIDE 5 MG: 5 TABLET ORAL at 13:47

## 2017-07-08 RX ADMIN — POTASSIUM CHLORIDE 20 MEQ: 1.5 POWDER, FOR SOLUTION ORAL at 17:17

## 2017-07-08 RX ADMIN — COLCHICINE 0.6 MG: 0.6 TABLET, FILM COATED ORAL at 09:54

## 2017-07-08 RX ADMIN — FAMOTIDINE 20 MG: 20 TABLET ORAL at 09:54

## 2017-07-08 RX ADMIN — MIDODRINE HYDROCHLORIDE 5 MG: 5 TABLET ORAL at 09:54

## 2017-07-08 RX ADMIN — FINASTERIDE 5 MG: 5 TABLET, FILM COATED ORAL at 13:47

## 2017-07-08 RX ADMIN — ALPRAZOLAM 0.25 MG: 0.25 TABLET ORAL at 01:06

## 2017-07-08 RX ADMIN — VERAPAMIL HYDROCHLORIDE 180 MG: 180 TABLET, FILM COATED, EXTENDED RELEASE ORAL at 20:57

## 2017-07-08 RX ADMIN — MIDODRINE HYDROCHLORIDE 5 MG: 5 TABLET ORAL at 17:17

## 2017-07-08 RX ADMIN — MAGNESIUM OXIDE TAB 400 MG (241.3 MG ELEMENTAL MG) 400 MG: 400 (241.3 MG) TAB at 09:54

## 2017-07-08 RX ADMIN — DOCUSATE SODIUM 100 MG: 100 CAPSULE, LIQUID FILLED ORAL at 20:56

## 2017-07-08 RX ADMIN — AMIODARONE HYDROCHLORIDE 200 MG: 200 TABLET ORAL at 07:54

## 2017-07-08 RX ADMIN — ALPRAZOLAM 0.25 MG: 0.25 TABLET ORAL at 20:57

## 2017-07-08 RX ADMIN — METOPROLOL TARTRATE 50 MG: 50 TABLET, FILM COATED ORAL at 21:01

## 2017-07-08 ASSESSMENT — ENCOUNTER SYMPTOMS
BACK PAIN: 0
CHOKING: 0
CHEST TIGHTNESS: 0
APNEA: 0
STRIDOR: 0
BLOOD IN STOOL: 0
ALLERGIC/IMMUNOLOGIC NEGATIVE: 1
COUGH: 0
GASTROINTESTINAL NEGATIVE: 1
SHORTNESS OF BREATH: 0
WHEEZING: 0
ABDOMINAL PAIN: 0

## 2017-07-08 ASSESSMENT — PAIN SCALES - GENERAL: PAINLEVEL_OUTOF10: 0

## 2017-07-09 VITALS
TEMPERATURE: 97.5 F | HEART RATE: 67 BPM | SYSTOLIC BLOOD PRESSURE: 116 MMHG | OXYGEN SATURATION: 97 % | DIASTOLIC BLOOD PRESSURE: 69 MMHG | RESPIRATION RATE: 20 BRPM

## 2017-07-09 PROCEDURE — 93005 ELECTROCARDIOGRAM TRACING: CPT

## 2017-07-09 PROCEDURE — 6370000000 HC RX 637 (ALT 250 FOR IP): Performed by: INTERNAL MEDICINE

## 2017-07-09 PROCEDURE — G0378 HOSPITAL OBSERVATION PER HR: HCPCS

## 2017-07-09 PROCEDURE — 99231 SBSQ HOSP IP/OBS SF/LOW 25: CPT | Performed by: NURSE PRACTITIONER

## 2017-07-09 PROCEDURE — 96376 TX/PRO/DX INJ SAME DRUG ADON: CPT

## 2017-07-09 PROCEDURE — 6360000002 HC RX W HCPCS: Performed by: NURSE PRACTITIONER

## 2017-07-09 RX ORDER — FUROSEMIDE 40 MG/1
TABLET ORAL
Qty: 60 TABLET | Refills: 3 | Status: ON HOLD
Start: 2017-07-09 | End: 2018-03-04

## 2017-07-09 RX ADMIN — POTASSIUM CHLORIDE 20 MEQ: 1.5 POWDER, FOR SOLUTION ORAL at 09:54

## 2017-07-09 RX ADMIN — AMIODARONE HYDROCHLORIDE 200 MG: 200 TABLET ORAL at 09:55

## 2017-07-09 RX ADMIN — METOPROLOL TARTRATE 50 MG: 50 TABLET, FILM COATED ORAL at 09:59

## 2017-07-09 RX ADMIN — TAMSULOSIN HYDROCHLORIDE 0.4 MG: 0.4 CAPSULE ORAL at 10:00

## 2017-07-09 RX ADMIN — COLCHICINE 0.6 MG: 0.6 TABLET, FILM COATED ORAL at 09:55

## 2017-07-09 RX ADMIN — MAGNESIUM OXIDE TAB 400 MG (241.3 MG ELEMENTAL MG) 400 MG: 400 (241.3 MG) TAB at 09:55

## 2017-07-09 RX ADMIN — RIVAROXABAN 15 MG: 15 TABLET, FILM COATED ORAL at 09:55

## 2017-07-09 RX ADMIN — FUROSEMIDE 40 MG: 10 INJECTION, SOLUTION INTRAMUSCULAR; INTRAVENOUS at 09:55

## 2017-07-09 RX ADMIN — MIDODRINE HYDROCHLORIDE 5 MG: 5 TABLET ORAL at 09:56

## 2017-07-09 RX ADMIN — DOCUSATE SODIUM 100 MG: 100 CAPSULE, LIQUID FILLED ORAL at 09:56

## 2017-07-09 RX ADMIN — FINASTERIDE 5 MG: 5 TABLET, FILM COATED ORAL at 10:00

## 2017-07-09 ASSESSMENT — ENCOUNTER SYMPTOMS
SHORTNESS OF BREATH: 0
COUGH: 0
CHOKING: 0

## 2017-07-09 ASSESSMENT — PAIN SCALES - GENERAL: PAINLEVEL_OUTOF10: 0

## 2017-07-10 LAB
EKG ATRIAL RATE: 68 BPM
EKG ATRIAL RATE: 75 BPM
EKG Q-T INTERVAL: 506 MS
EKG Q-T INTERVAL: 524 MS
EKG QRS DURATION: 136 MS
EKG QRS DURATION: 136 MS
EKG QTC CALCULATION (BAZETT): 510 MS
EKG QTC CALCULATION (BAZETT): 513 MS
EKG R AXIS: 179 DEGREES
EKG R AXIS: 269 DEGREES
EKG T AXIS: -31 DEGREES
EKG T AXIS: 88 DEGREES
EKG VENTRICULAR RATE: 57 BPM
EKG VENTRICULAR RATE: 62 BPM

## 2017-07-12 LAB — CULTURE, BLOOD 2: NORMAL

## 2017-07-13 LAB
EKG ATRIAL RATE: 70 BPM
EKG Q-T INTERVAL: 468 MS
EKG QRS DURATION: 144 MS
EKG QTC CALCULATION (BAZETT): 515 MS
EKG R AXIS: 264 DEGREES
EKG T AXIS: 71 DEGREES
EKG VENTRICULAR RATE: 73 BPM

## 2017-07-23 ENCOUNTER — HOSPITAL ENCOUNTER (EMERGENCY)
Age: 82
Discharge: HOME | End: 2017-07-24
Payer: MEDICARE

## 2017-07-23 DIAGNOSIS — N30.91 HEMORRHAGIC CYSTITIS: Primary | ICD-10-CM

## 2017-07-23 LAB
BACTERIA: ABNORMAL /HPF
BILIRUBIN URINE: ABNORMAL
BLOOD, URINE: ABNORMAL
CASTS 2: ABNORMAL /LPF
CASTS: ABNORMAL /LPF
CLARITY: ABNORMAL
COLOR: ABNORMAL
GLUCOSE URINE: NEGATIVE MG/DL
KETONES, URINE: ABNORMAL MG/DL
LEUKOCYTE ESTERASE, URINE: ABNORMAL
MUCUS: PRESENT
NITRITE, URINE: NEGATIVE
PH UA: 7.5 (ref 5–9)
PROTEIN UA: 100 MG/DL
RBC UA: >100 /HPF (ref 0–2)
SPECIFIC GRAVITY UA: 1.02 (ref 1–1.03)
URINE REFLEX TO CULTURE: YES
UROBILINOGEN, URINE: 1 E.U./DL
WBC UA: ABNORMAL /HPF (ref 0–5)

## 2017-07-23 PROCEDURE — 81001 URINALYSIS AUTO W/SCOPE: CPT

## 2017-07-23 PROCEDURE — 87086 URINE CULTURE/COLONY COUNT: CPT

## 2017-07-23 PROCEDURE — 99283 EMERGENCY DEPT VISIT LOW MDM: CPT

## 2017-07-23 RX ORDER — SULFAMETHOXAZOLE AND TRIMETHOPRIM 800; 160 MG/1; MG/1
1 TABLET ORAL ONCE
Status: COMPLETED | OUTPATIENT
Start: 2017-07-23 | End: 2017-07-24

## 2017-07-23 RX ORDER — BUMETANIDE 1 MG/1
2 TABLET ORAL DAILY
COMMUNITY
End: 2017-10-20 | Stop reason: SDUPTHER

## 2017-07-24 VITALS
SYSTOLIC BLOOD PRESSURE: 113 MMHG | OXYGEN SATURATION: 99 % | DIASTOLIC BLOOD PRESSURE: 66 MMHG | RESPIRATION RATE: 20 BRPM | HEART RATE: 76 BPM | TEMPERATURE: 98 F

## 2017-07-24 PROCEDURE — 6370000000 HC RX 637 (ALT 250 FOR IP): Performed by: PHYSICIAN ASSISTANT

## 2017-07-24 RX ORDER — SULFAMETHOXAZOLE AND TRIMETHOPRIM 800; 160 MG/1; MG/1
1 TABLET ORAL 2 TIMES DAILY
Qty: 20 TABLET | Refills: 0 | Status: SHIPPED | OUTPATIENT
Start: 2017-07-24 | End: 2017-08-03

## 2017-07-24 RX ADMIN — SULFAMETHOXAZOLE AND TRIMETHOPRIM 1 TABLET: 800; 160 TABLET ORAL at 00:03

## 2017-07-24 ASSESSMENT — ENCOUNTER SYMPTOMS
SHORTNESS OF BREATH: 0
COLOR CHANGE: 0
TROUBLE SWALLOWING: 0
ALLERGIC/IMMUNOLOGIC NEGATIVE: 1
ABDOMINAL PAIN: 0
APNEA: 0
EYE PAIN: 0

## 2017-07-25 LAB — URINE CULTURE, ROUTINE: NORMAL

## 2017-09-08 DIAGNOSIS — I48.20 CHRONIC ATRIAL FIBRILLATION (HCC): Primary | ICD-10-CM

## 2017-09-08 DIAGNOSIS — E78.2 MIXED DYSLIPIDEMIA: ICD-10-CM

## 2017-09-08 DIAGNOSIS — I10 ESSENTIAL HYPERTENSION: ICD-10-CM

## 2017-10-20 RX ORDER — BUMETANIDE 2 MG/1
2 TABLET ORAL DAILY
Qty: 90 TABLET | Refills: 3 | Status: SHIPPED | OUTPATIENT
Start: 2017-10-20

## 2017-11-29 ENCOUNTER — OFFICE VISIT (OUTPATIENT)
Dept: CARDIOLOGY | Age: 82
End: 2017-11-29

## 2017-11-29 ENCOUNTER — HOSPITAL ENCOUNTER (OUTPATIENT)
Dept: GENERAL RADIOLOGY | Age: 82
Discharge: HOME | End: 2017-11-29
Payer: MEDICARE

## 2017-11-29 VITALS
TEMPERATURE: 98 F | OXYGEN SATURATION: 100 % | RESPIRATION RATE: 16 BRPM | HEART RATE: 90 BPM | BODY MASS INDEX: 24.08 KG/M2 | WEIGHT: 172 LBS | DIASTOLIC BLOOD PRESSURE: 70 MMHG | HEIGHT: 71 IN | SYSTOLIC BLOOD PRESSURE: 128 MMHG

## 2017-11-29 DIAGNOSIS — E78.2 MIXED DYSLIPIDEMIA: ICD-10-CM

## 2017-11-29 DIAGNOSIS — I48.20 CHRONIC ATRIAL FIBRILLATION (HCC): ICD-10-CM

## 2017-11-29 DIAGNOSIS — I50.33 ACUTE ON CHRONIC DIASTOLIC HEART FAILURE (HCC): ICD-10-CM

## 2017-11-29 DIAGNOSIS — I50.33 ACUTE ON CHRONIC DIASTOLIC HEART FAILURE (HCC): Primary | ICD-10-CM

## 2017-11-29 DIAGNOSIS — R60.0 LEG EDEMA: ICD-10-CM

## 2017-11-29 DIAGNOSIS — I10 ESSENTIAL HYPERTENSION: ICD-10-CM

## 2017-11-29 PROCEDURE — G8420 CALC BMI NORM PARAMETERS: HCPCS | Performed by: INTERNAL MEDICINE

## 2017-11-29 PROCEDURE — 71020 XR CHEST STANDARD TWO VW: CPT

## 2017-11-29 PROCEDURE — 93000 ELECTROCARDIOGRAM COMPLETE: CPT | Performed by: INTERNAL MEDICINE

## 2017-11-29 PROCEDURE — G8484 FLU IMMUNIZE NO ADMIN: HCPCS | Performed by: INTERNAL MEDICINE

## 2017-11-29 PROCEDURE — 99215 OFFICE O/P EST HI 40 MIN: CPT | Performed by: INTERNAL MEDICINE

## 2017-11-29 PROCEDURE — 1036F TOBACCO NON-USER: CPT | Performed by: INTERNAL MEDICINE

## 2017-11-29 PROCEDURE — 4040F PNEUMOC VAC/ADMIN/RCVD: CPT | Performed by: INTERNAL MEDICINE

## 2017-11-29 PROCEDURE — 1123F ACP DISCUSS/DSCN MKR DOCD: CPT | Performed by: INTERNAL MEDICINE

## 2017-11-29 PROCEDURE — G8428 CUR MEDS NOT DOCUMENT: HCPCS | Performed by: INTERNAL MEDICINE

## 2017-11-29 RX ORDER — SPIRONOLACTONE 50 MG/1
50 TABLET, FILM COATED ORAL DAILY
Qty: 30 TABLET | Refills: 3 | Status: SHIPPED | OUTPATIENT
Start: 2017-11-29

## 2017-11-29 RX ORDER — SPIRONOLACTONE 50 MG/1
50 TABLET, FILM COATED ORAL DAILY
Qty: 30 TABLET | Refills: 3 | Status: SHIPPED | OUTPATIENT
Start: 2017-11-29 | End: 2017-11-29 | Stop reason: SDUPTHER

## 2017-11-29 ASSESSMENT — ENCOUNTER SYMPTOMS
CHEST TIGHTNESS: 0
SHORTNESS OF BREATH: 1
NAUSEA: 0
STRIDOR: 0
COUGH: 0
GASTROINTESTINAL NEGATIVE: 1
EYES NEGATIVE: 1
WHEEZING: 0
BLOOD IN STOOL: 0

## 2017-11-29 NOTE — PROGRESS NOTES
capsule Take 0.4 mg by mouth daily.  finasteride (PROSCAR) 5 MG tablet Take 5 mg by mouth daily.  magnesium oxide (MAG-OX) 400 (241.3 Mg) MG TABS tablet Take 1 tablet by mouth daily 90 tablet 3    furosemide (LASIX) 40 MG tablet 1 tab mon/wed/fri 60 tablet 3    amiodarone (CORDARONE) 200 MG tablet Take 1 tablet by mouth daily 30 tablet 3    magnesium citrate solution Take 148 mLs by mouth every other day      colchicine (COLCRYS) 0.6 MG tablet Take 0.6 mg by mouth daily      Coenzyme Q10 (CO Q 10) 100 MG CAPS Take  by mouth. No current facility-administered medications for this visit. Review of Systems:   Review of Systems   Constitutional: Positive for fatigue. Negative for diaphoresis. HENT: Negative. Eyes: Negative. Respiratory: Positive for shortness of breath. Negative for cough, chest tightness, wheezing and stridor. Cardiovascular: Positive for leg swelling. Negative for chest pain and palpitations. Gastrointestinal: Negative. Negative for blood in stool and nausea. Genitourinary: Negative. Musculoskeletal: Negative. Skin: Negative. Neurological: Positive for weakness. Negative for dizziness, syncope and light-headedness. Hematological: Negative. Psychiatric/Behavioral: Negative. Physical Examination:    /70 (Site: Left Arm, Position: Sitting, Cuff Size: Large Adult)   Pulse 90   Temp 98 °F (36.7 °C) (Temporal)   Resp 16   Ht 5' 11\" (1.803 m)   Wt 172 lb (78 kg)   SpO2 100%   BMI 23.99 kg/m²    Physical Exam   Constitutional: He appears chronically ill and acutely ill. HENT:   Normal cephalic and Atraumatic   Eyes: Pupils are equal, round, and reactive to light. Neck: Normal range of motion and thyroid normal. Neck supple. No JVD present. No neck adenopathy. No thyromegaly present. Cardiovascular: Normal rate, regular rhythm, intact distal pulses and normal pulses. Murmur heard.   Pulmonary/Chest: Effort normal. He has no 07/08/2017    CALCIUM 8.6 07/08/2017    GFRAA >60.0 07/08/2017    LABGLOM >60.0 07/08/2017    GLUCOSE 98 07/08/2017    GLUCOSE 92 12/16/2011     Magnesium:    Lab Results   Component Value Date    MG 2.0 04/28/2017    MG 1.8 12/15/2011     TSH:  Lab Results   Component Value Date    TSH 2.740 04/25/2017       Patient Active Problem List   Diagnosis    Chronic atrial fibrillation (HCC)    Essential hypertension    Mixed dyslipidemia    Metabolic encephalopathy    Acute on chronic diastolic heart failure (HCC)    Leg edema       Assessment/Plan:    1. Chronic atrial fibrillation (HCC)  Stay on [address] [place] [address] and rate control  - EKG 12 Lead    2. Essential hypertension  stable  - EKG 12 Lead  - Basic Metabolic Panel; Future  - CBC; Future    3. Mixed dyslipidemia  controlled    4. Acute on chronic diastolic heart failure (HCC)  Significant Leg edema and pleural effusion R>L. - XR CHEST STANDARD (2 VW); Future  Stop Calan and start Spironolactone  5. Leg edema  Compression stockings Rx       Counseling:  Heart Healthy Lifestyle, Improve BMI, Low Salt Diet, Volume Restriction 1500cc per day, Take Precautions to Prevent Falls and Walk Daily    Return in about 1 month (around 12/29/2017) for Cardiovascular care. .      Electronically signed by Carline Bennett MD on 11/29/2017 at 4:09 PM

## 2017-12-04 DIAGNOSIS — I50.33 ACUTE ON CHRONIC DIASTOLIC HEART FAILURE (HCC): ICD-10-CM

## 2017-12-04 DIAGNOSIS — R60.0 LEG EDEMA: Primary | ICD-10-CM

## 2017-12-04 DIAGNOSIS — I48.20 CHRONIC ATRIAL FIBRILLATION (HCC): ICD-10-CM

## 2017-12-29 DIAGNOSIS — I10 ESSENTIAL HYPERTENSION: ICD-10-CM

## 2017-12-29 LAB
ANION GAP SERPL CALCULATED.3IONS-SCNC: 14 MEQ/L (ref 7–13)
BUN BLDV-MCNC: 35 MG/DL (ref 8–23)
CALCIUM SERPL-MCNC: 9.4 MG/DL (ref 8.6–10.2)
CHLORIDE BLD-SCNC: 96 MEQ/L (ref 98–107)
CO2: 27 MEQ/L (ref 22–29)
CREAT SERPL-MCNC: 1.17 MG/DL (ref 0.7–1.2)
GFR AFRICAN AMERICAN: >60
GFR NON-AFRICAN AMERICAN: 58.4
GLUCOSE BLD-MCNC: 119 MG/DL (ref 74–109)
HCT VFR BLD CALC: 44.3 % (ref 42–52)
HEMOGLOBIN: 15.1 G/DL (ref 14–18)
MCH RBC QN AUTO: 31.7 PG (ref 27–31.3)
MCHC RBC AUTO-ENTMCNC: 34.1 % (ref 33–37)
MCV RBC AUTO: 92.9 FL (ref 80–100)
PDW BLD-RTO: 14.3 % (ref 11.5–14.5)
PLATELET # BLD: 234 K/UL (ref 130–400)
POTASSIUM SERPL-SCNC: 4.7 MEQ/L (ref 3.5–5.1)
RBC # BLD: 4.76 M/UL (ref 4.7–6.1)
SODIUM BLD-SCNC: 137 MEQ/L (ref 132–144)
WBC # BLD: 7.8 K/UL (ref 4.8–10.8)

## 2018-01-04 ENCOUNTER — OFFICE VISIT (OUTPATIENT)
Dept: CARDIOLOGY | Age: 83
End: 2018-01-04

## 2018-01-04 VITALS
DIASTOLIC BLOOD PRESSURE: 64 MMHG | RESPIRATION RATE: 16 BRPM | BODY MASS INDEX: 21.56 KG/M2 | TEMPERATURE: 97 F | SYSTOLIC BLOOD PRESSURE: 118 MMHG | HEIGHT: 71 IN | WEIGHT: 154 LBS | HEART RATE: 68 BPM

## 2018-01-04 DIAGNOSIS — I48.20 CHRONIC ATRIAL FIBRILLATION (HCC): ICD-10-CM

## 2018-01-04 DIAGNOSIS — I10 ESSENTIAL HYPERTENSION: ICD-10-CM

## 2018-01-04 DIAGNOSIS — E78.2 MIXED DYSLIPIDEMIA: ICD-10-CM

## 2018-01-04 DIAGNOSIS — I50.33 ACUTE ON CHRONIC DIASTOLIC HEART FAILURE (HCC): ICD-10-CM

## 2018-01-04 DIAGNOSIS — R60.0 LEG EDEMA: Primary | ICD-10-CM

## 2018-01-04 PROCEDURE — G8427 DOCREV CUR MEDS BY ELIG CLIN: HCPCS | Performed by: INTERNAL MEDICINE

## 2018-01-04 PROCEDURE — 99214 OFFICE O/P EST MOD 30 MIN: CPT | Performed by: INTERNAL MEDICINE

## 2018-01-04 PROCEDURE — 93000 ELECTROCARDIOGRAM COMPLETE: CPT | Performed by: INTERNAL MEDICINE

## 2018-01-04 PROCEDURE — 1123F ACP DISCUSS/DSCN MKR DOCD: CPT | Performed by: INTERNAL MEDICINE

## 2018-01-04 PROCEDURE — G8420 CALC BMI NORM PARAMETERS: HCPCS | Performed by: INTERNAL MEDICINE

## 2018-01-04 PROCEDURE — G8484 FLU IMMUNIZE NO ADMIN: HCPCS | Performed by: INTERNAL MEDICINE

## 2018-01-04 PROCEDURE — 4040F PNEUMOC VAC/ADMIN/RCVD: CPT | Performed by: INTERNAL MEDICINE

## 2018-01-04 PROCEDURE — 1036F TOBACCO NON-USER: CPT | Performed by: INTERNAL MEDICINE

## 2018-01-04 ASSESSMENT — ENCOUNTER SYMPTOMS
WHEEZING: 0
NAUSEA: 0
EYES NEGATIVE: 1
BLOOD IN STOOL: 0
COUGH: 0
STRIDOR: 0
CHEST TIGHTNESS: 0
RESPIRATORY NEGATIVE: 1
GASTROINTESTINAL NEGATIVE: 1
SHORTNESS OF BREATH: 0

## 2018-03-03 ENCOUNTER — HOSPITAL ENCOUNTER (INPATIENT)
Age: 83
LOS: 3 days | Discharge: NURSING, REHAB OR LONG-TERM CARE | DRG: 563 | End: 2018-03-07
Attending: INTERNAL MEDICINE | Admitting: INTERNAL MEDICINE
Payer: MEDICARE

## 2018-03-03 ENCOUNTER — APPOINTMENT (OUTPATIENT)
Dept: GENERAL RADIOLOGY | Age: 83
DRG: 563 | End: 2018-03-03
Payer: MEDICARE

## 2018-03-03 DIAGNOSIS — S42.401B ELBOW FRACTURE, RIGHT, OPEN, INITIAL ENCOUNTER: ICD-10-CM

## 2018-03-03 DIAGNOSIS — L03.113 RIGHT ARM CELLULITIS: Primary | ICD-10-CM

## 2018-03-03 DIAGNOSIS — S42.401D CLOSED FRACTURE OF DISTAL END OF RIGHT HUMERUS WITH ROUTINE HEALING, UNSPECIFIED FRACTURE MORPHOLOGY, SUBSEQUENT ENCOUNTER: ICD-10-CM

## 2018-03-03 LAB
BASOPHILS ABSOLUTE: 0.1 K/UL (ref 0–0.2)
BASOPHILS RELATIVE PERCENT: 0.7 %
EOSINOPHILS ABSOLUTE: 0 K/UL (ref 0–0.7)
EOSINOPHILS RELATIVE PERCENT: 0.5 %
HCT VFR BLD CALC: 41 % (ref 42–52)
HEMOGLOBIN: 13.8 G/DL (ref 14–18)
LYMPHOCYTES ABSOLUTE: 0.8 K/UL (ref 1–4.8)
LYMPHOCYTES RELATIVE PERCENT: 8.3 %
MCH RBC QN AUTO: 30.8 PG (ref 27–31.3)
MCHC RBC AUTO-ENTMCNC: 33.6 % (ref 33–37)
MCV RBC AUTO: 91.8 FL (ref 80–100)
MONOCYTES ABSOLUTE: 0.9 K/UL (ref 0.2–0.8)
MONOCYTES RELATIVE PERCENT: 9.5 %
NEUTROPHILS ABSOLUTE: 7.4 K/UL (ref 1.4–6.5)
NEUTROPHILS RELATIVE PERCENT: 81 %
PDW BLD-RTO: 14.7 % (ref 11.5–14.5)
PLATELET # BLD: 216 K/UL (ref 130–400)
RBC # BLD: 4.47 M/UL (ref 4.7–6.1)
WBC # BLD: 9.1 K/UL (ref 4.8–10.8)

## 2018-03-03 PROCEDURE — 73090 X-RAY EXAM OF FOREARM: CPT

## 2018-03-03 PROCEDURE — 99284 EMERGENCY DEPT VISIT MOD MDM: CPT

## 2018-03-03 PROCEDURE — 36415 COLL VENOUS BLD VENIPUNCTURE: CPT

## 2018-03-03 PROCEDURE — 87040 BLOOD CULTURE FOR BACTERIA: CPT

## 2018-03-03 PROCEDURE — 85025 COMPLETE CBC W/AUTO DIFF WBC: CPT

## 2018-03-03 PROCEDURE — 80053 COMPREHEN METABOLIC PANEL: CPT

## 2018-03-03 PROCEDURE — 73080 X-RAY EXAM OF ELBOW: CPT

## 2018-03-04 ENCOUNTER — APPOINTMENT (OUTPATIENT)
Dept: CT IMAGING | Age: 83
DRG: 563 | End: 2018-03-04
Payer: MEDICARE

## 2018-03-04 ENCOUNTER — APPOINTMENT (OUTPATIENT)
Dept: ULTRASOUND IMAGING | Age: 83
DRG: 563 | End: 2018-03-04
Payer: MEDICARE

## 2018-03-04 PROBLEM — L03.113 CELLULITIS OF ARM, RIGHT: Status: ACTIVE | Noted: 2018-03-04

## 2018-03-04 LAB
ALBUMIN SERPL-MCNC: 3.3 G/DL (ref 3.9–4.9)
ALP BLD-CCNC: 98 U/L (ref 35–104)
ALT SERPL-CCNC: 15 U/L (ref 0–41)
ANION GAP SERPL CALCULATED.3IONS-SCNC: 15 MEQ/L (ref 7–13)
AST SERPL-CCNC: 28 U/L (ref 0–40)
BILIRUB SERPL-MCNC: 1.7 MG/DL (ref 0–1.2)
BUN BLDV-MCNC: 34 MG/DL (ref 8–23)
CALCIUM SERPL-MCNC: 8.7 MG/DL (ref 8.6–10.2)
CHLORIDE BLD-SCNC: 97 MEQ/L (ref 98–107)
CO2: 23 MEQ/L (ref 22–29)
CREAT SERPL-MCNC: 0.96 MG/DL (ref 0.7–1.2)
GFR AFRICAN AMERICAN: >60
GFR NON-AFRICAN AMERICAN: >60
GLOBULIN: 2.8 G/DL (ref 2.3–3.5)
GLUCOSE BLD-MCNC: 136 MG/DL (ref 74–109)
POTASSIUM SERPL-SCNC: 4 MEQ/L (ref 3.5–5.1)
SODIUM BLD-SCNC: 135 MEQ/L (ref 132–144)
TOTAL PROTEIN: 6.1 G/DL (ref 6.4–8.1)

## 2018-03-04 PROCEDURE — 90471 IMMUNIZATION ADMIN: CPT | Performed by: PHYSICIAN ASSISTANT

## 2018-03-04 PROCEDURE — 6370000000 HC RX 637 (ALT 250 FOR IP): Performed by: INTERNAL MEDICINE

## 2018-03-04 PROCEDURE — 6360000002 HC RX W HCPCS: Performed by: INTERNAL MEDICINE

## 2018-03-04 PROCEDURE — 93971 EXTREMITY STUDY: CPT

## 2018-03-04 PROCEDURE — 90715 TDAP VACCINE 7 YRS/> IM: CPT | Performed by: PHYSICIAN ASSISTANT

## 2018-03-04 PROCEDURE — 6360000002 HC RX W HCPCS: Performed by: PHYSICIAN ASSISTANT

## 2018-03-04 PROCEDURE — 99222 1ST HOSP IP/OBS MODERATE 55: CPT | Performed by: INTERNAL MEDICINE

## 2018-03-04 PROCEDURE — 2580000003 HC RX 258: Performed by: INTERNAL MEDICINE

## 2018-03-04 PROCEDURE — 73200 CT UPPER EXTREMITY W/O DYE: CPT

## 2018-03-04 PROCEDURE — 1210000000 HC MED SURG R&B

## 2018-03-04 PROCEDURE — 2500000003 HC RX 250 WO HCPCS: Performed by: INTERNAL MEDICINE

## 2018-03-04 RX ORDER — TAMSULOSIN HYDROCHLORIDE 0.4 MG/1
0.4 CAPSULE ORAL DAILY
Status: DISCONTINUED | OUTPATIENT
Start: 2018-03-04 | End: 2018-03-07 | Stop reason: HOSPADM

## 2018-03-04 RX ORDER — SPIRONOLACTONE 50 MG/1
50 TABLET, FILM COATED ORAL DAILY
Status: DISCONTINUED | OUTPATIENT
Start: 2018-03-04 | End: 2018-03-07 | Stop reason: HOSPADM

## 2018-03-04 RX ORDER — METOPROLOL TARTRATE 50 MG/1
50 TABLET, FILM COATED ORAL 2 TIMES DAILY
Status: DISCONTINUED | OUTPATIENT
Start: 2018-03-04 | End: 2018-03-07 | Stop reason: HOSPADM

## 2018-03-04 RX ORDER — SODIUM CHLORIDE 0.9 % (FLUSH) 0.9 %
10 SYRINGE (ML) INJECTION PRN
Status: DISCONTINUED | OUTPATIENT
Start: 2018-03-04 | End: 2018-03-04 | Stop reason: SDUPTHER

## 2018-03-04 RX ORDER — ACETAMINOPHEN 325 MG/1
650 TABLET ORAL EVERY 4 HOURS PRN
Status: DISCONTINUED | OUTPATIENT
Start: 2018-03-04 | End: 2018-03-04 | Stop reason: SDUPTHER

## 2018-03-04 RX ORDER — ACETAMINOPHEN 325 MG/1
650 TABLET ORAL EVERY 4 HOURS PRN
Status: DISCONTINUED | OUTPATIENT
Start: 2018-03-04 | End: 2018-03-07 | Stop reason: HOSPADM

## 2018-03-04 RX ORDER — ONDANSETRON 2 MG/ML
4 INJECTION INTRAMUSCULAR; INTRAVENOUS EVERY 6 HOURS PRN
Status: DISCONTINUED | OUTPATIENT
Start: 2018-03-04 | End: 2018-03-07 | Stop reason: HOSPADM

## 2018-03-04 RX ORDER — VANCOMYCIN HYDROCHLORIDE 1 G/200ML
1000 INJECTION, SOLUTION INTRAVENOUS ONCE
Status: COMPLETED | OUTPATIENT
Start: 2018-03-04 | End: 2018-03-04

## 2018-03-04 RX ORDER — HYDROCODONE BITARTRATE AND ACETAMINOPHEN 5; 325 MG/1; MG/1
1 TABLET ORAL EVERY 4 HOURS PRN
Status: DISCONTINUED | OUTPATIENT
Start: 2018-03-04 | End: 2018-03-07 | Stop reason: HOSPADM

## 2018-03-04 RX ORDER — FINASTERIDE 5 MG/1
5 TABLET, FILM COATED ORAL DAILY
Status: DISCONTINUED | OUTPATIENT
Start: 2018-03-04 | End: 2018-03-07 | Stop reason: HOSPADM

## 2018-03-04 RX ORDER — COLCHICINE 0.6 MG/1
0.6 TABLET ORAL DAILY
Status: DISCONTINUED | OUTPATIENT
Start: 2018-03-04 | End: 2018-03-04

## 2018-03-04 RX ORDER — SODIUM CHLORIDE 0.9 % (FLUSH) 0.9 %
10 SYRINGE (ML) INJECTION EVERY 12 HOURS SCHEDULED
Status: DISCONTINUED | OUTPATIENT
Start: 2018-03-04 | End: 2018-03-07 | Stop reason: HOSPADM

## 2018-03-04 RX ORDER — BUMETANIDE 2 MG/1
2 TABLET ORAL DAILY
Status: DISCONTINUED | OUTPATIENT
Start: 2018-03-04 | End: 2018-03-07 | Stop reason: HOSPADM

## 2018-03-04 RX ORDER — SODIUM CHLORIDE 0.9 % (FLUSH) 0.9 %
10 SYRINGE (ML) INJECTION PRN
Status: DISCONTINUED | OUTPATIENT
Start: 2018-03-04 | End: 2018-03-07 | Stop reason: HOSPADM

## 2018-03-04 RX ORDER — AMIODARONE HYDROCHLORIDE 200 MG/1
200 TABLET ORAL DAILY
Status: DISCONTINUED | OUTPATIENT
Start: 2018-03-04 | End: 2018-03-04

## 2018-03-04 RX ORDER — SODIUM CHLORIDE 0.9 % (FLUSH) 0.9 %
10 SYRINGE (ML) INJECTION EVERY 12 HOURS SCHEDULED
Status: DISCONTINUED | OUTPATIENT
Start: 2018-03-04 | End: 2018-03-04 | Stop reason: SDUPTHER

## 2018-03-04 RX ADMIN — FINASTERIDE 5 MG: 5 TABLET, FILM COATED ORAL at 09:47

## 2018-03-04 RX ADMIN — CEFAZOLIN SODIUM 2 G: 2 SOLUTION INTRAVENOUS at 01:03

## 2018-03-04 RX ADMIN — HYDROCODONE BITARTRATE AND ACETAMINOPHEN 1 TABLET: 5; 325 TABLET ORAL at 13:24

## 2018-03-04 RX ADMIN — Medication 10 ML: at 09:49

## 2018-03-04 RX ADMIN — TETANUS TOXOID, REDUCED DIPHTHERIA TOXOID AND ACELLULAR PERTUSSIS VACCINE, ADSORBED 0.5 ML: 5; 2.5; 8; 8; 2.5 SUSPENSION INTRAMUSCULAR at 01:02

## 2018-03-04 RX ADMIN — VANCOMYCIN HYDROCHLORIDE 1000 MG: 1 INJECTION, SOLUTION INTRAVENOUS at 01:03

## 2018-03-04 RX ADMIN — METOPROLOL TARTRATE 50 MG: 50 TABLET, FILM COATED ORAL at 09:48

## 2018-03-04 RX ADMIN — TAMSULOSIN HYDROCHLORIDE 0.4 MG: 0.4 CAPSULE ORAL at 09:48

## 2018-03-04 RX ADMIN — SPIRONOLACTONE 50 MG: 50 TABLET ORAL at 09:49

## 2018-03-04 RX ADMIN — Medication 10 ML: at 22:50

## 2018-03-04 RX ADMIN — MAGNESIUM OXIDE TAB 400 MG (241.3 MG ELEMENTAL MG) 400 MG: 400 (241.3 MG) TAB at 09:47

## 2018-03-04 RX ADMIN — CEFAZOLIN 1 G: 1 INJECTION, POWDER, FOR SOLUTION INTRAMUSCULAR; INTRAVENOUS at 13:12

## 2018-03-04 RX ADMIN — CEFAZOLIN 1 G: 1 INJECTION, POWDER, FOR SOLUTION INTRAMUSCULAR; INTRAVENOUS at 21:46

## 2018-03-04 RX ADMIN — BUMETANIDE 2 MG: 2 TABLET ORAL at 09:48

## 2018-03-04 ASSESSMENT — ENCOUNTER SYMPTOMS
COUGH: 0
DIARRHEA: 0
ABDOMINAL PAIN: 0
SHORTNESS OF BREATH: 0
WHEEZING: 0
APNEA: 0
COLOR CHANGE: 1
ALLERGIC/IMMUNOLOGIC NEGATIVE: 1
VOMITING: 0
TROUBLE SWALLOWING: 0
COLOR CHANGE: 0
EYES NEGATIVE: 1
EYE PAIN: 0
STRIDOR: 0
CHOKING: 0
GASTROINTESTINAL NEGATIVE: 1
CHEST TIGHTNESS: 0

## 2018-03-04 ASSESSMENT — PAIN SCALES - GENERAL
PAINLEVEL_OUTOF10: 2
PAINLEVEL_OUTOF10: 4
PAINLEVEL_OUTOF10: 0

## 2018-03-04 NOTE — ED NOTES
Bed: 17  Expected date: 3/3/18  Expected time: 11:12 PM  Means of arrival: [facility]  Comments:  80 M R Arm Swollen/Pain  122/70, 98RA, [facility]  03/03/18 2749

## 2018-03-04 NOTE — CONSULTS
Right elbow in OCL splint. Right arm is ecchymotic and edema   Neurological: He is alert and oriented to person, place, and time. Skin: Skin is warm and dry.        LABS:  CBC:   Lab Results   Component Value Date    WBC 9.1 03/03/2018    RBC 4.47 03/03/2018    RBC 4.38 12/16/2011    HGB 13.8 03/03/2018    HCT 41.0 03/03/2018    MCV 91.8 03/03/2018    MCH 30.8 03/03/2018    MCHC 33.6 03/03/2018    RDW 14.7 03/03/2018     03/03/2018    MPV 8.9 05/05/2014     CBC with Differential:    Lab Results   Component Value Date    WBC 9.1 03/03/2018    RBC 4.47 03/03/2018    RBC 4.38 12/16/2011    HGB 13.8 03/03/2018    HCT 41.0 03/03/2018     03/03/2018    MCV 91.8 03/03/2018    MCH 30.8 03/03/2018    MCHC 33.6 03/03/2018    RDW 14.7 03/03/2018    LYMPHOPCT 8.3 03/03/2018    MONOPCT 9.5 03/03/2018    EOSPCT 0.9 12/15/2011    BASOPCT 0.7 03/03/2018    MONOSABS 0.9 03/03/2018    LYMPHSABS 0.8 03/03/2018    EOSABS 0.0 03/03/2018    BASOSABS 0.1 03/03/2018     CMP:    Lab Results   Component Value Date     03/03/2018    K 4.0 03/03/2018    CL 97 03/03/2018    CO2 23 03/03/2018    BUN 34 03/03/2018    CREATININE 0.96 03/03/2018    GFRAA >60.0 03/03/2018    LABGLOM >60.0 03/03/2018    GLUCOSE 136 03/03/2018    GLUCOSE 92 12/16/2011    PROT 6.1 03/03/2018    LABALBU 3.3 03/03/2018    LABALBU 3.8 12/16/2011    CALCIUM 8.7 03/03/2018    BILITOT 1.7 03/03/2018    ALKPHOS 98 03/03/2018    AST 28 03/03/2018    ALT 15 03/03/2018     BMP:    Lab Results   Component Value Date     03/03/2018    K 4.0 03/03/2018    CL 97 03/03/2018    CO2 23 03/03/2018    BUN 34 03/03/2018    LABALBU 3.3 03/03/2018    LABALBU 3.8 12/16/2011    CREATININE 0.96 03/03/2018    CALCIUM 8.7 03/03/2018    GFRAA >60.0 03/03/2018    LABGLOM >60.0 03/03/2018    GLUCOSE 136 03/03/2018    GLUCOSE 92 12/16/2011     Magnesium:    Lab Results   Component Value Date    MG 2.0 04/28/2017    MG 1.8 12/15/2011     Troponin:    Lab Results Component Value Date    TROPONINI 0.024 07/07/2017       EKG:      Assessment:    Active Hospital Problems    Diagnosis Date Noted    Cellulitis of arm, right [L03.113] 03/04/2018     Priority: Low    Essential hypertension [I10] 04/25/2017     Priority: Low    Mixed dyslipidemia [E78.2] 04/25/2017     Priority: Low    Chronic atrial fibrillation ([facility] [address] [I48.2] 08/26/2015     Priority: Low        DATE OF SERVICE:  04/25/2017     Technically, this is a good echo Doppler study.     M-Mode, 2-dimensional techniques and complete Doppler evaluation were done and  the findings are as follows:     Normal mitral valve structure with Doppler evidence of trivial mitral  regurgitation.     The left atrium is mild to moderately enlarged, as seen in the 4-chambers  apical view.     The aortic valve is tricuspid and it is of normal structure and function.     The aortic root diameter is normal.     The left ventricle is markedly hypertrophied in a concentric fashion with a  small cavity size and normal contractility, estimated ejection fraction is  probably in the range of 55%.     The Doppler recording of the mitral valve inflow revealed a predominant  passive filling related to the atrial fibrillation.     Abnormal Doppler recording of the pulmonary veins with diminished systolic  filling.     The right side of the heart revealed normal right ventricle, mild to  moderately enlarged right atrium with trivial tricuspid regurgitation and with  a calculated right ventricular systolic pressure of 33 mmHg.     The inferior vena cava is not well visualized.     No pericardial effusion or echo producing mass.     Plan:  1. Tele monitoring  2. Okay to hold oral anticoagulation until discussed with Ortho regarding plan  3. This patient needs surgery moderate to high risk for surgery discuss with patient and daughter okay to proceed with procedure if needed from cardiac standpoint  4.  Continue Lopressor 50 mg twice a day

## 2018-03-04 NOTE — H&P
complaints. PHYSICAL EXAMINATION:  GENERAL:  The patient is alert, awake, not in acute distress at this time. VITAL SIGNS:  Recorded so far show blood pressure of 155/65, pulse rate of  76, respiratory rate of 18, temperature 97.8. CVS:  2+.  RS:  Generally clear. ABDOMEN:  Soft. CENTRAL NERVOUS SYSTEM:  Shows no obvious focal deficits. Review of labs so far, sodium 135, potassium 4, BUN is 34, creatinine 0.9. White cell count 9.2, hemoglobin 13.8, platelets 919. CT of the right elbow results are pending. X-ray of the right elbow showed  olecranon fracture, again the results are pending at this time. IMPRESSION:  1. Right olecranon fracture. The patient was seen by Dr. Subha Richmond. He is  planning to do a CT and then decided on aggressive versus nonaggressive  approaches. 2.  Right upper extremity cellulitis. The patient is started on  antibiotics. At the moment, it appears to be more related to bruise _____  cellulitis. The patient has been on blood thinners. 3.  History of chronic edema. Decrease the dose of Bumex. The patient was  off Lasix, so I will discontinue it. 4.  Uncontrolled hypertension. 5.  History of AFib. Continue with Xarelto once stable. 6.  History of gait imbalance and functional mobility impairment. We will  monitor and follow the patient up in the morning.       Pipe De La Cruz MD    D: 03/04/2018 9:10:38       T: 03/04/2018 14:33:19     [email or web address]  Job#: [id number]     Doc#: [id number]    CC:

## 2018-03-04 NOTE — ED PROVIDER NOTES
[address] [facility] ED  eMERGENCY dEPARTMENT eNCOUnter      Pt Name: Reny Varela  MRN: [id number]  [place] 10/21/1926  Date of evaluation: 3/3/2018  Provider: Lakshmi Patel PA-C    CHIEF COMPLAINT       Chief Complaint   Patient presents with    Arm Injury     right arm         HISTORY OF PRESENT ILLNESS  (Location/Symptom, Timing/Onset, Context/Setting, Quality, Duration, Modifying Factors, Severity.)   Reny Varela is a 80 y.o. male who presents to the emergency department With complaints of pain and swelling to the right elbow status post fall approximately 3 days ago. Patient did not have the wound evaluated in the time and states that the areas continue to swell since injury occurred. Patient now states that area is very warm and painful to the touch and warmth and pain or spreading down to the wrist and up from the elbow proximally towards the shoulder though does not reach the shoulder joint. Patient denies any known fevers constitutional symptoms otherwise. Patient typically uses the right arm to ambulate with walker has been unable to ambulate because he has been unable to support himself with a walker. Patient denies any head injuries, neck injuries, back injuries, saddle paresthesias or loss of bowel or bladder control. HPI    Nursing Notes were reviewed and I agree. REVIEW OF SYSTEMS    (2-9 systems for level 4, 10 or more for level 5)     Review of Systems   Constitutional: Positive for fatigue. Negative for diaphoresis and fever. HENT: Negative for hearing loss and trouble swallowing. Eyes: Negative for pain. Respiratory: Negative for apnea, shortness of breath and wheezing. Cardiovascular: Negative for chest pain and palpitations. Gastrointestinal: Negative for abdominal pain, diarrhea and vomiting. Endocrine: Negative. Genitourinary: Negative for hematuria. Musculoskeletal: Positive for arthralgias and joint swelling.  Negative for neck pain reviewed. DIAGNOSTIC RESULTS     RADIOLOGY:   Non-plain film images such as CT, Ultrasound and MRI are read by the radiologist. Plain radiographic images are visualized and preliminarily interpreted by Court Allen PA-C with the below findings:    Pos frac  No DVT    Interpretation per the Radiologist below, if available at the time of this note:    XR ELBOW RIGHT (MIN 3 VIEWS)    (Results Pending)   XR RADIUS ULNA RIGHT (2 VIEWS)    (Results Pending)   US DUP UPPER EXTREMITY RIGHT VENOUS    (Results Pending)       LABS:  Labs Reviewed   COMPREHENSIVE METABOLIC PANEL - Abnormal; Notable for the following:        Result Value    Chloride 97 (*)     Anion Gap 15 (*)     Glucose 136 (*)     BUN 34 (*)     Total Protein 6.1 (*)     Alb 3.3 (*)     Total Bilirubin 1.7 (*)     All other components within normal limits   CBC WITH AUTO DIFFERENTIAL - Abnormal; Notable for the following:     RBC 4.47 (*)     Hemoglobin 13.8 (*)     Hematocrit 41.0 (*)     RDW 14.7 (*)     Neutrophils # 7.4 (*)     Lymphocytes # 0.8 (*)     Monocytes # 0.9 (*)     All other components within normal limits   CULTURE BLOOD #1   CULTURE BLOOD #2       All other labs were within normal range or not returned as of this dictation. EMERGENCY DEPARTMENT COURSE and DIFFERENTIAL DIAGNOSIS/MDM:   Vitals:    Vitals:    03/03/18 2335   BP: (!) 155/68   Pulse: 76   Temp: 97.8 °F (36.6 °C)   SpO2: 100%   Weight: 155 lb (70.3 kg)   Height: 6' 1\" (1.854 m)       REASSESSMENT     ED Course      Patient presented emergency department with cellulitis and elbow fracture. Patient will be admitted in the hospital for IV antibiotics for the cellulitis and orthopedic consult for the elbow fracture      MDM    PROCEDURES:    Procedures      FINAL IMPRESSION      1. Right arm cellulitis    2. Elbow fracture, right, open, initial encounter          DISPOSITION/PLAN   DISPOSITION        PATIENT REFERRED TO:  No follow-up provider specified.     DISCHARGE

## 2018-03-04 NOTE — CARE COORDINATION
I MET WITH PATIENT AND DTR. DTR FLIVES WITH PATIENT AND HELPS HIM WITH HIS CARE AS NEEDED. DTR REPORTS HE IS FORGETFUL. HE HAS ELBOW FRACTURE WITH CELLULITIS OF RIGHT ARM. AWAITING ORTHO DECISION RE POSSIBLE SURGERY, OR CONSERVATIVE TREATMENT. DTR WOULD LIKE PATIENT TO HAVE THERAPY BEFORE HE GOES [facility], AND REQUESTS [facility] [address].   CARE TEAM WILL FOLLOW

## 2018-03-05 PROCEDURE — 2500000003 HC RX 250 WO HCPCS: Performed by: INTERNAL MEDICINE

## 2018-03-05 PROCEDURE — 6360000002 HC RX W HCPCS: Performed by: INTERNAL MEDICINE

## 2018-03-05 PROCEDURE — 99232 SBSQ HOSP IP/OBS MODERATE 35: CPT | Performed by: INTERNAL MEDICINE

## 2018-03-05 PROCEDURE — 2580000003 HC RX 258: Performed by: INTERNAL MEDICINE

## 2018-03-05 PROCEDURE — 6370000000 HC RX 637 (ALT 250 FOR IP): Performed by: INTERNAL MEDICINE

## 2018-03-05 PROCEDURE — 93005 ELECTROCARDIOGRAM TRACING: CPT

## 2018-03-05 PROCEDURE — 1210000000 HC MED SURG R&B

## 2018-03-05 RX ORDER — DIGOXIN 125 MCG
125 TABLET ORAL DAILY
Status: DISCONTINUED | OUTPATIENT
Start: 2018-03-05 | End: 2018-03-07 | Stop reason: HOSPADM

## 2018-03-05 RX ADMIN — ACETAMINOPHEN 650 MG: 325 TABLET ORAL at 22:45

## 2018-03-05 RX ADMIN — DIGOXIN 125 MCG: 125 TABLET ORAL at 14:16

## 2018-03-05 RX ADMIN — METOPROLOL TARTRATE 50 MG: 50 TABLET, FILM COATED ORAL at 22:45

## 2018-03-05 RX ADMIN — TAMSULOSIN HYDROCHLORIDE 0.4 MG: 0.4 CAPSULE ORAL at 10:09

## 2018-03-05 RX ADMIN — Medication 10 ML: at 10:10

## 2018-03-05 RX ADMIN — METOPROLOL TARTRATE 50 MG: 50 TABLET, FILM COATED ORAL at 10:09

## 2018-03-05 RX ADMIN — MAGNESIUM OXIDE TAB 400 MG (241.3 MG ELEMENTAL MG) 400 MG: 400 (241.3 MG) TAB at 10:09

## 2018-03-05 RX ADMIN — CEFAZOLIN 1 G: 1 INJECTION, POWDER, FOR SOLUTION INTRAMUSCULAR; INTRAVENOUS at 02:48

## 2018-03-05 RX ADMIN — CEFAZOLIN 1 G: 1 INJECTION, POWDER, FOR SOLUTION INTRAMUSCULAR; INTRAVENOUS at 14:17

## 2018-03-05 RX ADMIN — FINASTERIDE 5 MG: 5 TABLET, FILM COATED ORAL at 10:09

## 2018-03-05 RX ADMIN — SPIRONOLACTONE 50 MG: 50 TABLET ORAL at 10:07

## 2018-03-05 RX ADMIN — Medication 10 ML: at 22:45

## 2018-03-05 ASSESSMENT — PAIN SCALES - GENERAL
PAINLEVEL_OUTOF10: 0
PAINLEVEL_OUTOF10: 5

## 2018-03-05 NOTE — PROGRESS NOTES
INPATIENT PROGRESS NOTES    PATIENT NAME: Javier García  MRN: [id number]  SERVICE DATE:  March 5, 2018   SERVICE TIME:  12:29 PM      PRIMARY SERVICE:Internal Medicine    INTERVAL HPI: Patient seen and examined at bedside, Interval Notes, orders reviewed. Nursing notes noted    SUBJECTIVE    CHIEF COMPLAINT: No new complaints. Review of Systems  Please see HPI above. All bolded are positive. All un-bolded are negative. Gen: fever, chills, fatigue, weakness, diaphoresis, increase in thirst, unintentional weight change, loss of appetite  Head: headache, vision change, hearing loss  Chest: chest pain, chest heaviness, palpitations, orthopnea, PND, DELEON  Lungs: shortness of breath, wheezing, coughing  Abdomen: abdominal pain, nausea, vomiting, diarrhea, constipation, melena, hematochezia, hematemesis  Extremities: lower extremity edema, myalgias, arthralgias  Urinary: dysuria, hematuria, or increase in frequency  Neurologic: lightheadedness, dizziness, confusion, syncope  Psychiatric: depression, suicidal ideation, or anxiety        OBJECTIVE    Body mass index is 20.45 kg/m². PHYSICAL EXAM:  Vitals:  BP (!) 112/55   Pulse 83   Temp 97.5 °F (36.4 °C) (Oral)   Resp 18   Ht 6' 1\" (1.854 m)   Wt 155 lb (70.3 kg)   SpO2 99%   BMI 20.45 kg/m²   General: comfortable in bed, No distress. Head: Atraumatic , Normocephalic   Eyes: PERRL. No sclera icterus. No conjunctival injection. No discharge   ENT: No nasal  discharge. Pharynx clear. Neck:  Trachea midline. No thyromegaly, no JVD, No cervical adenopathy. Resp : Normal effort,  No accessory muscle use. No Rales. No wheezing. No rhonchi. No dullness on percussion. CV: Normal  rate. Regular rhythm. No mumur ,  Rub or gallop  ABD: Non-tender. Non-distended. No masses. No organmegaly. Normal bowel sounds. No hernia. EXT: No Pitting, No Cyanosis No clubbing  Neuro: no focal weakness  Skin: Warm and dry. No erythema rash on exposed extremities.     Swelling of FRACTURE RADIAL HEAD WITH IMPACTION. X-RAY:  RIGHT FOREARM, 3 VIEW(S) COMPARISONS:  NONE CLINICAL HISTORY:  fall  PAIN FINDINGS:  Again seen are fractures involving the distal humerus and proximal radius. Please see description above. No other fracture or dislocation seen. Marginal spurring of the radial head and ulnar olecranon groove. IMPRESSION:  FRACTURES OF THE DISTAL HUMERUS AND PROXIMAL RADIAL HEAD. SEE DISCUSSION IN REGARDS TO THE ELBOW ABOVE. Xr Radius Ulna Right (2 Views)    Result Date: 3/4/2018  X-RAY: RIGHT ELBOW, 4 VIEWS COMPARISONS:  NONE CLINICAL HISTORY: TRAUMA WITH PAIN. FINDINGS:  There is an acute supracondylar fracture of the distal humerus. The humeral diaphysis is displaced in a lateral and anterior direction. There is impaction at the fracture site. There bony fragments adjacent to the fracture site. In addition there is a fracture involving the radial head with impaction. There is marginal spurring of the olecranon groove of the ulna. A bony fragment is seen adjacent to the medial epicondyle of the distal humerus. Marginal spurring and joint spaces. Suspect joint space effusion. COMMINUTED IMPACTED FRACTURE OF DISTAL HUMERUS. THE HUMERAL DIAPHYSIS IS DISPLACED LATERALLY AND ANTERIORLY. FRACTURE RADIAL HEAD WITH IMPACTION. X-RAY:  RIGHT FOREARM, 3 VIEW(S) COMPARISONS:  NONE CLINICAL HISTORY:  fall  PAIN FINDINGS:  Again seen are fractures involving the distal humerus and proximal radius. Please see description above. No other fracture or dislocation seen. Marginal spurring of the radial head and ulnar olecranon groove. IMPRESSION:  FRACTURES OF THE DISTAL HUMERUS AND PROXIMAL RADIAL HEAD. SEE DISCUSSION IN REGARDS TO THE ELBOW ABOVE. Us Dup Upper Extremity Right Venous    Result Date: 3/4/2018  UPPER EXTREMITY VENOUS DUPLEX EXAM: REASON FOR EXAMINATION: Right arm pain and edema. A venous duplex exam of the right upper extremity was obtained.  The following veins were evaluated,

## 2018-03-05 NOTE — PROGRESS NOTES
12/16/2011    CREATININE 0.96 03/03/2018    CALCIUM 8.7 03/03/2018    GFRAA >60.0 03/03/2018    LABGLOM >60.0 03/03/2018    GLUCOSE 136 03/03/2018    GLUCOSE 92 12/16/2011     Magnesium:    Lab Results   Component Value Date    MG 2.0 04/28/2017    MG 1.8 12/15/2011     Troponin:    Lab Results   Component Value Date    TROPONINI 0.024 07/07/2017       EKG:  Tele a-fib    Assessment:    Active Hospital Problems    Diagnosis Date Noted    Cellulitis of arm, right [L03.113] 03/04/2018     Priority: Low    Essential hypertension [I10] 04/25/2017     Priority: Low    Mixed dyslipidemia [E78.2] 04/25/2017     Priority: Low    Chronic atrial fibrillation ([facility] [address] [I48.2] 08/26/2015     Priority: Low           Plan:  1. Tele monitoring  2. Continue to hold [address]  3. Monitor magnesium levels and keep greater 2.0  4. Monitor potassium levels and keep greater than 4.0  Electronically signed by Gerry Paniagua NP on 3/5/2018 at 9:24 AM      Attending Supervising [de-identified] Attestation Statement  The patient is a 80 y.o. male. I have performed a history and physical examination of the patient. I discussed the case with the nurse practitioner. I reviewed the patient's Past Medical History, Past Surgical History, Medications, and Allergies.      Physical Exam:  Vitals:    03/04/18 0203 03/04/18 0725 03/04/18 1626 03/04/18 2315   BP:  111/63 (!) 99/50 98/63   Pulse:  102 79 (!) 41   Resp: 19 18 18 18   Temp:  97.9 °F (36.6 °C) 97.9 °F (36.6 °C) 98.2 °F (36.8 °C)   TempSrc: Oral Oral Oral Oral   SpO2:  100% 99% 96%   Weight:       Height:           Review of Systems - Respiratory ROS: no cough, shortness of breath, or wheezing  Cardiovascular ROS: no chest pain or dyspnea on exertion  Gastrointestinal ROS: no abdominal pain, change in bowel habits, or black or bloody stools    Pulmonary/Chest: clear to auscultation bilaterally- no wheezes, rales or rhonchi, normal air movement, no respiratory distress  Cardiovascular: irregularly irregular rhythm noted  Abdomen: soft, non-tender, non-distended, normal bowel sounds, no masses or organomegaly    Active Hospital Problems    Diagnosis Date Noted    Cellulitis of arm, right [L03.113] 03/04/2018     Priority: Low    Essential hypertension [I10] 04/25/2017     Priority: Low    Mixed dyslipidemia [E78.2] 04/25/2017     Priority: Low    Chronic atrial fibrillation ([facility] [address] [I48.2] 08/26/2015     Priority: Low        I reviewed and agree with the findings and plan documented in his note . Impression/Plan     1. Fall with Right arm FX  2. AF rate 100-  Add Dig for rate control   3.         LVEF 55%    Pt is an acceptable candidate for arm surgery    Electronically signed by Cameron Rebolledo MD on 3/5/18 at 9:40 AM

## 2018-03-05 NOTE — CARE COORDINATION
LSW spoke with Helena Samples with [facility] on [address] to give them the referral for pt. Helena Samples will review info and get pt ready for transfer when medically stable.   LSW left message with pt's daughter to confirm plan

## 2018-03-06 PROCEDURE — 2500000003 HC RX 250 WO HCPCS: Performed by: INTERNAL MEDICINE

## 2018-03-06 PROCEDURE — 1210000000 HC MED SURG R&B

## 2018-03-06 PROCEDURE — 6360000002 HC RX W HCPCS: Performed by: INTERNAL MEDICINE

## 2018-03-06 PROCEDURE — 99232 SBSQ HOSP IP/OBS MODERATE 35: CPT | Performed by: INTERNAL MEDICINE

## 2018-03-06 PROCEDURE — 93010 ELECTROCARDIOGRAM REPORT: CPT | Performed by: INTERNAL MEDICINE

## 2018-03-06 PROCEDURE — 6370000000 HC RX 637 (ALT 250 FOR IP): Performed by: INTERNAL MEDICINE

## 2018-03-06 PROCEDURE — 93005 ELECTROCARDIOGRAM TRACING: CPT

## 2018-03-06 PROCEDURE — 2580000003 HC RX 258: Performed by: INTERNAL MEDICINE

## 2018-03-06 RX ORDER — CEPHALEXIN 500 MG/1
500 CAPSULE ORAL 3 TIMES DAILY
Qty: 9 CAPSULE | Refills: 0 | Status: SHIPPED | OUTPATIENT
Start: 2018-03-06

## 2018-03-06 RX ORDER — HYDROCODONE BITARTRATE AND ACETAMINOPHEN 5; 325 MG/1; MG/1
1 TABLET ORAL EVERY 4 HOURS PRN
Qty: 10 TABLET | Refills: 0 | Status: SHIPPED | OUTPATIENT
Start: 2018-03-06 | End: 2018-03-07 | Stop reason: HOSPADM

## 2018-03-06 RX ORDER — DIGOXIN 125 MCG
125 TABLET ORAL DAILY
Qty: 30 TABLET | Refills: 3 | Status: SHIPPED | OUTPATIENT
Start: 2018-03-07

## 2018-03-06 RX ORDER — CEPHALEXIN 500 MG/1
500 CAPSULE ORAL 3 TIMES DAILY
Status: DISCONTINUED | OUTPATIENT
Start: 2018-03-06 | End: 2018-03-07 | Stop reason: HOSPADM

## 2018-03-06 RX ADMIN — SPIRONOLACTONE 50 MG: 50 TABLET ORAL at 08:46

## 2018-03-06 RX ADMIN — ACETAMINOPHEN 650 MG: 325 TABLET ORAL at 03:45

## 2018-03-06 RX ADMIN — HYDROCODONE BITARTRATE AND ACETAMINOPHEN 1 TABLET: 5; 325 TABLET ORAL at 13:29

## 2018-03-06 RX ADMIN — CEFAZOLIN 1 G: 1 INJECTION, POWDER, FOR SOLUTION INTRAMUSCULAR; INTRAVENOUS at 08:45

## 2018-03-06 RX ADMIN — CEPHALEXIN 500 MG: 500 CAPSULE ORAL at 21:40

## 2018-03-06 RX ADMIN — TAMSULOSIN HYDROCHLORIDE 0.4 MG: 0.4 CAPSULE ORAL at 08:46

## 2018-03-06 RX ADMIN — MAGNESIUM OXIDE TAB 400 MG (241.3 MG ELEMENTAL MG) 400 MG: 400 (241.3 MG) TAB at 08:46

## 2018-03-06 RX ADMIN — Medication 10 ML: at 08:46

## 2018-03-06 RX ADMIN — FINASTERIDE 5 MG: 5 TABLET, FILM COATED ORAL at 08:46

## 2018-03-06 RX ADMIN — METOPROLOL TARTRATE 50 MG: 50 TABLET, FILM COATED ORAL at 08:46

## 2018-03-06 RX ADMIN — BUMETANIDE 2 MG: 2 TABLET ORAL at 08:46

## 2018-03-06 RX ADMIN — CEPHALEXIN 500 MG: 500 CAPSULE ORAL at 13:31

## 2018-03-06 RX ADMIN — CEFAZOLIN 1 G: 1 INJECTION, POWDER, FOR SOLUTION INTRAMUSCULAR; INTRAVENOUS at 00:22

## 2018-03-06 RX ADMIN — DIGOXIN 125 MCG: 125 TABLET ORAL at 08:46

## 2018-03-06 RX ADMIN — Medication 10 ML: at 21:43

## 2018-03-06 ASSESSMENT — ENCOUNTER SYMPTOMS
NAUSEA: 0
EYES NEGATIVE: 1
WHEEZING: 0
COUGH: 0
BLOOD IN STOOL: 0
RESPIRATORY NEGATIVE: 1
STRIDOR: 0
GASTROINTESTINAL NEGATIVE: 1
SHORTNESS OF BREATH: 0
CHEST TIGHTNESS: 0

## 2018-03-06 ASSESSMENT — PAIN SCALES - GENERAL
PAINLEVEL_OUTOF10: 2
PAINLEVEL_OUTOF10: 0
PAINLEVEL_OUTOF10: 5

## 2018-03-06 NOTE — PROGRESS NOTES
03/03/2018    CO2 23 03/03/2018    BUN 34 03/03/2018    CREATININE 0.96 03/03/2018    GFRAA >60.0 03/03/2018    LABGLOM >60.0 03/03/2018    GLUCOSE 136 03/03/2018    GLUCOSE 92 12/16/2011    PROT 6.1 03/03/2018    LABALBU 3.3 03/03/2018    LABALBU 3.8 12/16/2011    CALCIUM 8.7 03/03/2018    BILITOT 1.7 03/03/2018    ALKPHOS 98 03/03/2018    AST 28 03/03/2018    ALT 15 03/03/2018     BMP:    Lab Results   Component Value Date     03/03/2018    K 4.0 03/03/2018    CL 97 03/03/2018    CO2 23 03/03/2018    BUN 34 03/03/2018    LABALBU 3.3 03/03/2018    LABALBU 3.8 12/16/2011    CREATININE 0.96 03/03/2018    CALCIUM 8.7 03/03/2018    GFRAA >60.0 03/03/2018    LABGLOM >60.0 03/03/2018    GLUCOSE 136 03/03/2018    GLUCOSE 92 12/16/2011     Magnesium:    Lab Results   Component Value Date    MG 2.0 04/28/2017    MG 1.8 12/15/2011     Troponin:    Lab Results   Component Value Date    TROPONINI 0.024 07/07/2017        Active Hospital Problems    Diagnosis Date Noted    Right arm cellulitis [N31.337]      Priority: High    Cellulitis of arm, right [L03.113] 03/04/2018     Priority: Low    Essential hypertension [I10] 04/25/2017     Priority: Low    Mixed dyslipidemia [E78.2] 04/25/2017     Priority: Low    Chronic atrial fibrillation ([facility] [address] [I48.2] 08/26/2015     Priority: Low        Assessment/Plan:  1. Right Humeral Fx  2. AF on OAC and Rate control  3. HTN stable  4. LVEF 55  5.  OK for dc and out pt f/u       Electronically signed by Chris Guy MD on 3/6/2018 at 11:47 AM

## 2018-03-07 ENCOUNTER — APPOINTMENT (OUTPATIENT)
Dept: ORTHOPEDIC SURGERY | Age: 83
DRG: 563 | End: 2018-03-07
Payer: MEDICARE

## 2018-03-07 VITALS
RESPIRATION RATE: 16 BRPM | WEIGHT: 144.2 LBS | TEMPERATURE: 97.7 F | HEIGHT: 73 IN | DIASTOLIC BLOOD PRESSURE: 70 MMHG | BODY MASS INDEX: 19.11 KG/M2 | HEART RATE: 87 BPM | OXYGEN SATURATION: 98 % | SYSTOLIC BLOOD PRESSURE: 110 MMHG

## 2018-03-07 LAB
EKG ATRIAL RATE: 53 BPM
EKG ATRIAL RATE: 64 BPM
EKG ATRIAL RATE: 93 BPM
EKG Q-T INTERVAL: 358 MS
EKG Q-T INTERVAL: 414 MS
EKG Q-T INTERVAL: 462 MS
EKG QRS DURATION: 152 MS
EKG QRS DURATION: 152 MS
EKG QRS DURATION: 166 MS
EKG QTC CALCULATION (BAZETT): 464 MS
EKG QTC CALCULATION (BAZETT): 542 MS
EKG QTC CALCULATION (BAZETT): 555 MS
EKG R AXIS: 258 DEGREES
EKG R AXIS: 258 DEGREES
EKG R AXIS: 260 DEGREES
EKG T AXIS: 80 DEGREES
EKG T AXIS: 82 DEGREES
EKG T AXIS: 83 DEGREES
EKG VENTRICULAR RATE: 101 BPM
EKG VENTRICULAR RATE: 103 BPM
EKG VENTRICULAR RATE: 87 BPM

## 2018-03-07 PROCEDURE — 6370000000 HC RX 637 (ALT 250 FOR IP): Performed by: INTERNAL MEDICINE

## 2018-03-07 PROCEDURE — 2580000003 HC RX 258: Performed by: INTERNAL MEDICINE

## 2018-03-07 PROCEDURE — 73080 X-RAY EXAM OF ELBOW: CPT

## 2018-03-07 PROCEDURE — 99232 SBSQ HOSP IP/OBS MODERATE 35: CPT | Performed by: INTERNAL MEDICINE

## 2018-03-07 PROCEDURE — 93010 ELECTROCARDIOGRAM REPORT: CPT | Performed by: INTERNAL MEDICINE

## 2018-03-07 RX ADMIN — METOPROLOL TARTRATE 50 MG: 50 TABLET, FILM COATED ORAL at 09:45

## 2018-03-07 RX ADMIN — TAMSULOSIN HYDROCHLORIDE 0.4 MG: 0.4 CAPSULE ORAL at 09:46

## 2018-03-07 RX ADMIN — DIGOXIN 125 MCG: 125 TABLET ORAL at 09:45

## 2018-03-07 RX ADMIN — BUMETANIDE 2 MG: 2 TABLET ORAL at 09:45

## 2018-03-07 RX ADMIN — MAGNESIUM OXIDE TAB 400 MG (241.3 MG ELEMENTAL MG) 400 MG: 400 (241.3 MG) TAB at 09:45

## 2018-03-07 RX ADMIN — FINASTERIDE 5 MG: 5 TABLET, FILM COATED ORAL at 09:46

## 2018-03-07 RX ADMIN — CEPHALEXIN 500 MG: 500 CAPSULE ORAL at 09:45

## 2018-03-07 RX ADMIN — SPIRONOLACTONE 50 MG: 50 TABLET ORAL at 09:45

## 2018-03-07 RX ADMIN — Medication 10 ML: at 09:45

## 2018-03-07 ASSESSMENT — PAIN SCALES - GENERAL: PAINLEVEL_OUTOF10: 1

## 2018-03-07 NOTE — DISCHARGE SUMMARY
Addendum    Patient is ready to be discharged to nursing home yesterday. Nursing home requested that returns today. Only change overnight was discontinuation of narcotics by orthopedics team.  I did not see the patient. Please refer to my note from yesterday for full details.
RIGHT FOREARM, 3 VIEW(S) COMPARISONS:  NONE CLINICAL HISTORY:  fall  PAIN FINDINGS:  Again seen are fractures involving the distal humerus and proximal radius. Please see description above. No other fracture or dislocation seen. Marginal spurring of the radial head and ulnar olecranon groove. IMPRESSION:  FRACTURES OF THE DISTAL HUMERUS AND PROXIMAL RADIAL HEAD. SEE DISCUSSION IN REGARDS TO THE ELBOW ABOVE. Xr Radius Ulna Right (2 Views)    Result Date: 3/4/2018  X-RAY: RIGHT ELBOW, 4 VIEWS COMPARISONS:  NONE CLINICAL HISTORY: TRAUMA WITH PAIN. FINDINGS:  There is an acute supracondylar fracture of the distal humerus. The humeral diaphysis is displaced in a lateral and anterior direction. There is impaction at the fracture site. There bony fragments adjacent to the fracture site. In addition there is a fracture involving the radial head with impaction. There is marginal spurring of the olecranon groove of the ulna. A bony fragment is seen adjacent to the medial epicondyle of the distal humerus. Marginal spurring and joint spaces. Suspect joint space effusion. COMMINUTED IMPACTED FRACTURE OF DISTAL HUMERUS. THE HUMERAL DIAPHYSIS IS DISPLACED LATERALLY AND ANTERIORLY. FRACTURE RADIAL HEAD WITH IMPACTION. X-RAY:  RIGHT FOREARM, 3 VIEW(S) COMPARISONS:  NONE CLINICAL HISTORY:  fall  PAIN FINDINGS:  Again seen are fractures involving the distal humerus and proximal radius. Please see description above. No other fracture or dislocation seen. Marginal spurring of the radial head and ulnar olecranon groove. IMPRESSION:  FRACTURES OF THE DISTAL HUMERUS AND PROXIMAL RADIAL HEAD. SEE DISCUSSION IN REGARDS TO THE ELBOW ABOVE. Us Dup Upper Extremity Right Venous    Result Date: 3/4/2018  UPPER EXTREMITY VENOUS DUPLEX EXAM: REASON FOR EXAMINATION: Right arm pain and edema. A venous duplex exam of the right upper extremity was obtained.  The following veins were evaluated, including the jugular, subclavian, axillary,

## 2018-03-09 LAB
BLOOD CULTURE, ROUTINE: NORMAL
CULTURE, BLOOD 2: NORMAL

## 2018-03-12 ENCOUNTER — OFFICE VISIT (OUTPATIENT)
Dept: GERIATRIC MEDICINE | Age: 83
End: 2018-03-12
Payer: MEDICARE

## 2018-03-12 DIAGNOSIS — M79.601 RIGHT ARM PAIN: Primary | ICD-10-CM

## 2018-03-12 DIAGNOSIS — I10 ESSENTIAL HYPERTENSION: ICD-10-CM

## 2018-03-12 DIAGNOSIS — I48.20 CHRONIC ATRIAL FIBRILLATION (HCC): ICD-10-CM

## 2018-03-12 DIAGNOSIS — R53.1 WEAKNESS: ICD-10-CM

## 2018-03-12 PROCEDURE — 1123F ACP DISCUSS/DSCN MKR DOCD: CPT | Performed by: INTERNAL MEDICINE

## 2018-03-12 PROCEDURE — 99305 1ST NF CARE MODERATE MDM 35: CPT | Performed by: INTERNAL MEDICINE

## 2018-03-15 ENCOUNTER — OFFICE VISIT (OUTPATIENT)
Dept: GERIATRIC MEDICINE | Age: 83
End: 2018-03-15
Payer: MEDICARE

## 2018-03-15 DIAGNOSIS — I48.20 CHRONIC ATRIAL FIBRILLATION (HCC): ICD-10-CM

## 2018-03-15 DIAGNOSIS — N28.9 RENAL INSUFFICIENCY: ICD-10-CM

## 2018-03-15 DIAGNOSIS — L03.113 RIGHT ARM CELLULITIS: Primary | ICD-10-CM

## 2018-03-15 DIAGNOSIS — I10 ESSENTIAL HYPERTENSION: ICD-10-CM

## 2018-03-15 DIAGNOSIS — D72.829 LEUKOCYTOSIS, UNSPECIFIED TYPE: ICD-10-CM

## 2018-03-15 LAB
BASOPHILS ABSOLUTE: NORMAL /ΜL
BASOPHILS RELATIVE PERCENT: NORMAL %
BUN BLDV-MCNC: 66 MG/DL
CALCIUM SERPL-MCNC: 8.8 MG/DL
CHLORIDE BLD-SCNC: 93 MMOL/L
CO2: 30 MMOL/L
CREAT SERPL-MCNC: 1.2 MG/DL
EOSINOPHILS ABSOLUTE: NORMAL /ΜL
EOSINOPHILS RELATIVE PERCENT: NORMAL %
GFR CALCULATED: NORMAL
GLUCOSE BLD-MCNC: 131 MG/DL
HCT VFR BLD CALC: 42.2 % (ref 41–53)
HEMOGLOBIN: 14.3 G/DL (ref 13.5–17.5)
LYMPHOCYTES ABSOLUTE: NORMAL /ΜL
LYMPHOCYTES RELATIVE PERCENT: NORMAL %
MCH RBC QN AUTO: 30.9 PG
MCHC RBC AUTO-ENTMCNC: 33.8 G/DL
MCV RBC AUTO: 91.5 FL
MONOCYTES ABSOLUTE: NORMAL /ΜL
MONOCYTES RELATIVE PERCENT: NORMAL %
NEUTROPHILS ABSOLUTE: NORMAL /ΜL
NEUTROPHILS RELATIVE PERCENT: NORMAL %
PLATELET # BLD: 319 K/ΜL
PMV BLD AUTO: 8.9 FL
POTASSIUM SERPL-SCNC: 4.5 MMOL/L
RBC # BLD: 4.61 10^6/ΜL
SODIUM BLD-SCNC: 135 MMOL/L
WBC # BLD: 8.4 10^3/ML

## 2018-03-15 PROCEDURE — 1123F ACP DISCUSS/DSCN MKR DOCD: CPT | Performed by: NURSE PRACTITIONER

## 2018-03-15 PROCEDURE — 99309 SBSQ NF CARE MODERATE MDM 30: CPT | Performed by: NURSE PRACTITIONER

## 2018-03-15 PROCEDURE — G8484 FLU IMMUNIZE NO ADMIN: HCPCS | Performed by: NURSE PRACTITIONER

## 2018-03-16 LAB
BASOPHILS ABSOLUTE: NORMAL /ΜL
BASOPHILS RELATIVE PERCENT: NORMAL %
BUN BLDV-MCNC: 66 MG/DL
CALCIUM SERPL-MCNC: 9 MG/DL
CHLORIDE BLD-SCNC: 90 MMOL/L
CO2: 33 MMOL/L
CREAT SERPL-MCNC: 1.2 MG/DL
EOSINOPHILS ABSOLUTE: NORMAL /ΜL
EOSINOPHILS RELATIVE PERCENT: NORMAL %
GFR CALCULATED: NORMAL
GLUCOSE BLD-MCNC: 129 MG/DL
HCT VFR BLD CALC: 41.4 % (ref 41–53)
HEMOGLOBIN: 14.4 G/DL (ref 13.5–17.5)
LYMPHOCYTES ABSOLUTE: NORMAL /ΜL
LYMPHOCYTES RELATIVE PERCENT: NORMAL %
MCH RBC QN AUTO: 31.3 PG
MCHC RBC AUTO-ENTMCNC: 34.7 G/DL
MCV RBC AUTO: 90.3 FL
MONOCYTES ABSOLUTE: NORMAL /ΜL
MONOCYTES RELATIVE PERCENT: NORMAL %
NEUTROPHILS ABSOLUTE: NORMAL /ΜL
NEUTROPHILS RELATIVE PERCENT: NORMAL %
PLATELET # BLD: 313 K/ΜL
PMV BLD AUTO: 8.8 FL
POTASSIUM SERPL-SCNC: 4.2 MMOL/L
RBC # BLD: 4.58 10^6/ΜL
SODIUM BLD-SCNC: 133 MMOL/L
WBC # BLD: 8.6 10^3/ML

## 2018-03-20 VITALS — SYSTOLIC BLOOD PRESSURE: 113 MMHG | HEART RATE: 82 BPM | DIASTOLIC BLOOD PRESSURE: 60 MMHG | TEMPERATURE: 96.1 F

## 2018-03-20 NOTE — PROGRESS NOTES
PATIENT: Joe Gimenez : 10/21/1926 DOS: 2018     [facility] [place]    Seen for admission history and physical.    CHIEF COMPLAINT: Humerus fracture, degenerative joint disease, unsteadiness. HISTORY OF PRESENT ILLNESS: This is a 70-year-old gentleman who is admitted here from [facility]. The patient had suffered a fall and injured his right upper extremity. The patient was brought to the ER, had evidence of fracture in the distal humerus. The patient has a history of atrial fibrillation with chronic anticoagulation. The patient had been stabilized in terms of his cardiac function. The patient was seen by orthopedics, who recommended ORIF. The patient was stabilized, did make gains, was subsequently transferred here for ongoing care. The patient is seated up today, ongoing intermittent pain. The patient is clinically stable. There was concern for possibility of cellulitis. The patient has not had any new nausea or vomiting, ongoing intermittent pain issues. No evidence few new RVR. There is no new lightheadedness. The patient has not had any new chest pain or palpitation. No evidence of new RVR. PAST MEDICAL HISTORY: Included AFib, degenerative joint disease, hypertension, weakness, unsteadiness status post fall, constipation, degenerative joint disease. FAMILY HISTORY: Negative for early onset neoplasm. Included coronary artery disease. SOCIAL HISTORY: The patient is nondrinker. The patient ultimately was able to be managed without surgical intervention at this time, although he was evaluated for possibly ORIF. REVIEW OF SYSTEMS: The patient denies chest pain, palpitations, nausea, vomiting, ongoing stiffness in his elbow. No recent headaches, fevers, chills. No change in his bowel or bladder habits. Rest of his 14-point review of systems was unremarkable.      MEDICATIONS: On arrival included Xarelto 15 mg daily, metoprolol tartrate 50 mg twice daily, spironolactone 50 mg

## 2018-03-30 ENCOUNTER — OFFICE VISIT (OUTPATIENT)
Dept: GERIATRIC MEDICINE | Age: 83
End: 2018-03-30
Payer: MEDICARE

## 2018-03-30 DIAGNOSIS — I10 ESSENTIAL HYPERTENSION: ICD-10-CM

## 2018-03-30 DIAGNOSIS — S42.421D CLOSED DISPLACED COMMINUTED SUPRACONDYLAR FRACTURE OF RIGHT HUMERUS WITHOUT INTERCONDYLAR FRACTURE WITH ROUTINE HEALING, SUBSEQUENT ENCOUNTER: ICD-10-CM

## 2018-03-30 DIAGNOSIS — I48.20 CHRONIC ATRIAL FIBRILLATION (HCC): Primary | ICD-10-CM

## 2018-03-30 PROCEDURE — 99316 NF DSCHRG MGMT 30 MIN+: CPT | Performed by: NURSE PRACTITIONER

## 2018-03-30 PROCEDURE — G8484 FLU IMMUNIZE NO ADMIN: HCPCS | Performed by: NURSE PRACTITIONER

## 2018-04-04 VITALS
OXYGEN SATURATION: 99 % | RESPIRATION RATE: 18 BRPM | SYSTOLIC BLOOD PRESSURE: 101 MMHG | HEART RATE: 72 BPM | DIASTOLIC BLOOD PRESSURE: 57 MMHG | TEMPERATURE: 97.7 F

## 2018-04-04 VITALS
RESPIRATION RATE: 18 BRPM | TEMPERATURE: 97.6 F | HEART RATE: 66 BPM | DIASTOLIC BLOOD PRESSURE: 55 MMHG | OXYGEN SATURATION: 100 % | SYSTOLIC BLOOD PRESSURE: 99 MMHG

## 2018-04-05 PROBLEM — N28.9 RENAL INSUFFICIENCY: Status: ACTIVE | Noted: 2018-03-15

## 2018-04-05 PROBLEM — D72.829 LEUKOCYTOSIS: Status: ACTIVE | Noted: 2018-03-15

## 2018-04-08 PROBLEM — S42.309A HUMERUS FRACTURE: Status: ACTIVE | Noted: 2018-03-30

## 2019-01-21 NOTE — DISCHARGE INSTR - COC
pain score (0-10 scale): Pain Level: 1  Last Weight:   Wt Readings from Last 1 Encounters:   03/07/18 144 lb 3.2 oz (65.4 kg)     Mental Status:  {IP PT MENTAL STATUS:56290}    IV Access:  { PAT IV ACCESS:125790552}    Nursing Mobility/ADLs:  Walking   {CHP DME YPWN:294506350}  Transfer  {P DME LOVF:483433633}  Bathing  {CHP DME SUWZ:757909576}  Dressing  {CHP DME PCHZ:402344073}  Toileting  {CHP DME YMQO:613019550}  Feeding  {P DME FSEX:749651364}  Med Admin  {P DME QNZJ:045702191}  Med Delivery   { PAT MED Delivery:687060160}    Wound Care Documentation and Therapy:  Wound 03/04/18 Other (Comment) Buttocks Left quarter sized stage 2 wound  (Active)   Wound Type Wound 3/5/2018 10:37 PM   Wound Pressure Stage  2 3/5/2018 10:10 AM   Dressing Status Clean;Dry; Intact 3/5/2018 10:37 PM   Dressing Changed Changed/New 3/6/2018  8:56 AM   Dressing/Treatment Foam 3/5/2018 10:37 PM   Wound Cleansed Rinsed/Irrigated with saline 3/4/2018  3:30 AM   Dressing Change Due 03/07/18 3/5/2018 10:37 PM   Wound Length (cm) 3 cm 3/4/2018  3:30 AM   Wound Width (cm) 3 cm 3/4/2018  3:30 AM   Calculated Wound Size (cm^2) (l*w) 9 cm^2 3/4/2018  3:30 AM   Wound Assessment Pink 3/4/2018  3:30 AM   Drainage Amount None 3/4/2018  3:30 AM   Odor None 3/4/2018  3:30 AM   Culture Taken No 3/5/2018 10:10 AM   Number of days: 323       Wound 03/04/18 Buttocks Right stage 2  (Active)   Wound Type Wound 3/5/2018 10:37 PM   Wound Pressure Stage  2 3/5/2018 10:10 AM   Dressing Status Clean;Dry; Intact 3/5/2018 10:37 PM   Dressing Changed Changed/New 3/6/2018  8:56 AM   Dressing/Treatment Foam 3/5/2018 10:37 PM   Wound Cleansed Rinsed/Irrigated with saline 3/4/2018  3:30 AM   Dressing Change Due 03/07/18 3/5/2018 10:37 PM   Wound Length (cm) 3 cm 3/4/2018  3:30 AM   Wound Width (cm) 3 cm 3/4/2018  3:30 AM   Calculated Wound Size (cm^2) (l*w) 9 cm^2 3/4/2018  3:30 AM   Wound Assessment Pink 3/4/2018  3:30 AM   Drainage Amount None 3/4/2018  3:30